# Patient Record
Sex: FEMALE | Race: WHITE | NOT HISPANIC OR LATINO | Employment: UNEMPLOYED | URBAN - METROPOLITAN AREA
[De-identification: names, ages, dates, MRNs, and addresses within clinical notes are randomized per-mention and may not be internally consistent; named-entity substitution may affect disease eponyms.]

---

## 2023-03-23 ENCOUNTER — TELEPHONE (OUTPATIENT)
Dept: PRIMARY CARE | Facility: CLINIC | Age: 88
End: 2023-03-23
Payer: MEDICARE

## 2023-03-23 NOTE — TELEPHONE ENCOUNTER
Pts daughter Nicky is calling in regards to fareed losing 20lbs in the past 2 years and is just wondering maybe if she should start drinking ensure or maybe a supplement, Nicky states mom is thin but does not look malnourished - JMP

## 2023-04-17 ENCOUNTER — TELEPHONE (OUTPATIENT)
Dept: PRIMARY CARE | Facility: CLINIC | Age: 88
End: 2023-04-17
Payer: MEDICARE

## 2023-04-17 DIAGNOSIS — E55.9 VITAMIN D DEFICIENCY: ICD-10-CM

## 2023-04-17 DIAGNOSIS — R53.83 FATIGUE, UNSPECIFIED TYPE: ICD-10-CM

## 2023-04-17 DIAGNOSIS — I10 PRIMARY HYPERTENSION: Primary | ICD-10-CM

## 2023-04-17 NOTE — TELEPHONE ENCOUNTER
FYI : She has an appt for a Medicare Wellness Friday can you put the orders in the computer for her bw. She cx her appt with Dr. Tolu Saunders said it would be repetitive but can you check and just make sure he did not order anything specific for her. Pjd/nn

## 2023-04-19 PROBLEM — R53.83 FATIGUE: Status: ACTIVE | Noted: 2023-04-19

## 2023-04-19 PROBLEM — R26.81 GAIT INSTABILITY: Status: ACTIVE | Noted: 2023-04-19

## 2023-04-19 PROBLEM — J45.909 ASTHMA (HHS-HCC): Status: ACTIVE | Noted: 2023-04-19

## 2023-04-19 PROBLEM — E78.5 HYPERLIPIDEMIA: Status: ACTIVE | Noted: 2023-04-19

## 2023-04-19 PROBLEM — F41.9 ANXIETY DISORDER: Status: ACTIVE | Noted: 2023-04-19

## 2023-04-19 PROBLEM — M19.90 OSTEOARTHROSIS: Status: ACTIVE | Noted: 2023-04-19

## 2023-04-19 PROBLEM — I48.91 ATRIAL FIBRILLATION (MULTI): Status: ACTIVE | Noted: 2023-04-19

## 2023-04-19 PROBLEM — M16.11 ARTHRITIS OF RIGHT HIP: Status: ACTIVE | Noted: 2023-04-19

## 2023-04-19 PROBLEM — J45.41: Status: ACTIVE | Noted: 2023-04-19

## 2023-04-19 PROBLEM — L50.8 CHRONIC URTICARIA: Status: ACTIVE | Noted: 2023-04-19

## 2023-04-19 PROBLEM — R41.89 COGNITIVE IMPAIRMENT: Status: ACTIVE | Noted: 2023-04-19

## 2023-04-19 PROBLEM — D51.0 PERNICIOUS ANEMIA: Status: ACTIVE | Noted: 2023-04-19

## 2023-04-19 PROBLEM — N18.30 CKD (CHRONIC KIDNEY DISEASE), STAGE III (MULTI): Status: ACTIVE | Noted: 2023-04-19

## 2023-04-19 NOTE — PROGRESS NOTES
Chief Complaint: Medicare Wellness Exam/Comprehensive Problem Focused Follow Up and Physical Exam    HPI: Patient presents today for Medicare wellness exam  Past history significant for  She really has been extraordinarily healthy  History of atrial fibrillation follows routinely with cardiology is on chronic anticoagulation hypercholesteremia  Pernicious anemia  Asthma  Dementia  Gastroesophageal reflux  2014 had pneumonia and influenza hospitalized intubated multiorgan failure but did come back from this and currently resides in an independent living facility  She is active in the facility walking      Active Problem List      Comprehensive Medical/Surgical/Social/Family History  Past Medical History:   Diagnosis Date    Essential (primary) hypertension     Benign essential hypertension    Noninfective gastroenteritis and colitis, unspecified     Chronic colitis    Other conditions influencing health status     Bone Density Studies Dual-Energy X-ray Absorptiometry    Other conditions influencing health status     Substance Use Disorders    Other conditions influencing health status     Adenocarcinoma Of The Breast    Other conditions influencing health status 07/13/2017    History of cough    Shortness of breath 07/18/2022    Shortness of breath     Past Surgical History:   Procedure Laterality Date    COLONOSCOPY  04/23/2013    Complete Colonoscopy    LUMBAR LAMINECTOMY  05/06/2013    Laminectomy Lumbar    MASTECTOMY  05/06/2013    Breast Surgery Mastectomy    TOTAL ABDOMINAL HYSTERECTOMY W/ BILATERAL SALPINGOOPHORECTOMY  05/06/2013    Total Abdominal Hysterectomy With Removal Of Both Ovaries    TOTAL HIP ARTHROPLASTY  05/06/2013    Total Hip Replacement     Social History     Social History Narrative    Not on file         Allergies and Medications  Patient has no allergy information on record.  No current outpatient medications on file prior to visit.     No current facility-administered medications on file  "prior to visit.       Medications and Supplements  prescribed by me and other practitioners or clinical pharmacist (such as prescriptions, OTC's, herbal therapies and supplements) were reviewed and documented in the medical record.    Tobacco/Alcohol/Opioid use, as well as Illicit Drug Use was screened for/reviewed and documented in Social History section and medication list as appropriate  Activities of Daily Living  In your present state of health, do you have any difficulty performing the following activities?:   Preparing food and eating?: Yes  Bathing yourself: Yes  Getting dressed: Yes  Using the toilet:Yes  Moving around from place to place: Yes  In the past year have you fallen or had a near fall?:Yes    Depression Screen  (Note: if answer to either of the following is \"Yes\", then a more complete depression screening is indicated)   Q1: Over the past two weeks, have you felt down, depressed or hopeless? No  Q2: Over the past two weeks, have you felt little interest or pleasure in doing things? No    Current exercise habits: Exercise is limited by cardiac condition(s): Atrial fibrillation.   Dietary issues discussed: Yes  Hearing difficulties: Yes  Safe in current home environment: yes  Visual Acuity assessed: no  Cognitive Impairment assessed: yes       Advance directives  Advanced Care Planning (including a Living Will, Healthcare POA, as well as specific end of life choices and/or directives), was discussed for approximately 16 minutes with the patient and/or surrogate, voluntarily, and documented in the medical record.     Cardiac Risk Assessment  Cardiovascular risk was discussed and, if needed, lifestyle modifications recommended, including nutritional choices, exercise, and elimination of habits contributing to risk. We agreed on a plan to reduce the current cardiovascular risk based on above discussion as needed.  Aspirin use/disuse was discussed after reviewing the updated guidelines " below:    Consider low dose Aspirin ( mg) use if the benefit for cardiovascular disease prevention outweighs risk for bleeding complications.   In general, low dose ASA should be considered:  In patients WITHOUT prior MI/stroke/PAD (primary prevention):   a. Age <60: Use if 10-year cardiovascular disease risk >20%, with discussion of risks and benefits with patient  b. Age 60-<70: Use if 10-year cardiovascular disease risk >20% and low bleeding (e.g., gastrointenstinal) risk, with discussion of risks and benefits with patient  c. Age >=70: Do not use    In patients WITH prior MI/stroke/PAD (secondary prevention):   Generally use unless extremely high bleeding (e.g., gastrointenstinal) risk, with discussion of risks and benefits with patient    ROS otherwise negative aside from what was mentioned above in HPI.    Vitals  There were no vitals taken for this visit.  There is no height or weight on file to calculate BMI.  Physical Exam  Gen: Alert, NAD  HEENT:  PERRLA, EOMI, conjunctiva and sclera normal in appearance. External auditory canals/TMs normal; Oral cavity and posterior pharynx without lesions/exudate  Neck:  Supple with FROM; No masses/nodes palpable; Thyroid nontender and without nodules; No JAYNA  Respiratory:  Lungs CTAB  Cardiovascular:  Heart  irregular no M/R/G. Peripheral pulses equal bilaterally  Trace edema is present left greater than right which is baseline for her  Abdomen:  Soft, nontender, BS present throughout; No R/G/R; No HSM or masses palpated  Extremities:  FROM all extremities; Muscle strength grossly normal with good tone  Neuro:  CN II-XII intact; Reflexes 2+/2+; Gross motor and sensory intact  Skin:  No suspicious lesions present  Breast: No masses, skin lesions or nipple discharges, no axillary lymphadenopathy    Gait is unstable difficulty getting up more than anything else    Assessment and Plan:  1 Atrial fibrillation stable we did obtain an EKG which shows atrial fibrillation  rate is well controlled around 70 continue current regimen she is on chronic anticoagulation  2.  Hypercholesteremia we checked blood work today  3.  History of congestive heart failure clinically is euvolemic  4.  Asthma stable lungs are clear she uses nebulizer a couple times a day  5.  DJD this is really one of her big limiting factors sometimes forgets and has had a few falls  6.  Cognitive impairment seems to be doing well she is in assisted living some progression but overall seems to be doing okay I feel she is in an appropriate place seems safe in her environment  7.  Health maintenance  Increase oral intake she has lost some weight we discussed supplements like Ensure  Blood work is pending  She remains pretty active physically mentally at her assisted living facility  Up-to-date with immunizations  Increase water intake  She does have a living will power of  for healthcare as well  #8 anxiety stable on low-dose Zoloft continue current regimen  9.  Chronic kidney disease stable          During the course of the visit the patient was educated and counseled about age appropriate screening and preventive services. Completed preventive screenings were documented in the chart and orders were placed for outstanding screenings/procedures as documented in the Assessment and Plan.      Patient Instructions (the written plan) was given to the patient at check out.      Haleigh Ponce MD

## 2023-04-20 ENCOUNTER — LAB (OUTPATIENT)
Dept: LAB | Facility: LAB | Age: 88
End: 2023-04-20
Payer: MEDICARE

## 2023-04-20 ENCOUNTER — OFFICE VISIT (OUTPATIENT)
Dept: PRIMARY CARE | Facility: CLINIC | Age: 88
End: 2023-04-20
Payer: MEDICARE

## 2023-04-20 VITALS
SYSTOLIC BLOOD PRESSURE: 118 MMHG | HEIGHT: 63 IN | BODY MASS INDEX: 22.68 KG/M2 | DIASTOLIC BLOOD PRESSURE: 68 MMHG | WEIGHT: 128 LBS

## 2023-04-20 DIAGNOSIS — I48.11 LONGSTANDING PERSISTENT ATRIAL FIBRILLATION (MULTI): ICD-10-CM

## 2023-04-20 DIAGNOSIS — N18.31 STAGE 3A CHRONIC KIDNEY DISEASE (MULTI): ICD-10-CM

## 2023-04-20 DIAGNOSIS — R53.83 FATIGUE, UNSPECIFIED TYPE: ICD-10-CM

## 2023-04-20 DIAGNOSIS — F41.9 ANXIETY DISORDER, UNSPECIFIED TYPE: ICD-10-CM

## 2023-04-20 DIAGNOSIS — I50.9 CONGESTIVE HEART FAILURE, UNSPECIFIED HF CHRONICITY, UNSPECIFIED HEART FAILURE TYPE (MULTI): ICD-10-CM

## 2023-04-20 DIAGNOSIS — E55.9 VITAMIN D DEFICIENCY: ICD-10-CM

## 2023-04-20 DIAGNOSIS — I10 PRIMARY HYPERTENSION: ICD-10-CM

## 2023-04-20 DIAGNOSIS — J45.40 MODERATE PERSISTENT ASTHMA WITHOUT COMPLICATION (HHS-HCC): Primary | ICD-10-CM

## 2023-04-20 DIAGNOSIS — M19.90 OSTEOARTHRITIS, UNSPECIFIED OSTEOARTHRITIS TYPE, UNSPECIFIED SITE: ICD-10-CM

## 2023-04-20 PROBLEM — E78.01 FAMILIAL HYPERCHOLESTEREMIA: Status: ACTIVE | Noted: 2023-04-20

## 2023-04-20 LAB
ALANINE AMINOTRANSFERASE (SGPT) (U/L) IN SER/PLAS: 11 U/L (ref 7–45)
ALBUMIN (G/DL) IN SER/PLAS: 3.4 G/DL (ref 3.4–5)
ALKALINE PHOSPHATASE (U/L) IN SER/PLAS: 56 U/L (ref 33–136)
ANION GAP IN SER/PLAS: 11 MMOL/L (ref 10–20)
ASPARTATE AMINOTRANSFERASE (SGOT) (U/L) IN SER/PLAS: 16 U/L (ref 9–39)
BILIRUBIN TOTAL (MG/DL) IN SER/PLAS: 0.4 MG/DL (ref 0–1.2)
CALCIDIOL (25 OH VITAMIN D3) (NG/ML) IN SER/PLAS: 44 NG/ML
CALCIUM (MG/DL) IN SER/PLAS: 8.5 MG/DL (ref 8.6–10.3)
CARBON DIOXIDE, TOTAL (MMOL/L) IN SER/PLAS: 27 MMOL/L (ref 21–32)
CHLORIDE (MMOL/L) IN SER/PLAS: 105 MMOL/L (ref 98–107)
CHOLESTEROL (MG/DL) IN SER/PLAS: 121 MG/DL (ref 0–199)
CHOLESTEROL IN HDL (MG/DL) IN SER/PLAS: 34 MG/DL
CHOLESTEROL/HDL RATIO: 3.6
CREATININE (MG/DL) IN SER/PLAS: 0.72 MG/DL (ref 0.5–1.05)
ERYTHROCYTE DISTRIBUTION WIDTH (RATIO) BY AUTOMATED COUNT: 15.1 % (ref 11.5–14.5)
ERYTHROCYTE MEAN CORPUSCULAR HEMOGLOBIN CONCENTRATION (G/DL) BY AUTOMATED: 31.2 G/DL (ref 32–36)
ERYTHROCYTE MEAN CORPUSCULAR VOLUME (FL) BY AUTOMATED COUNT: 87 FL (ref 80–100)
ERYTHROCYTES (10*6/UL) IN BLOOD BY AUTOMATED COUNT: 4.37 X10E12/L (ref 4–5.2)
GFR FEMALE: 78 ML/MIN/1.73M2
GLUCOSE (MG/DL) IN SER/PLAS: 113 MG/DL (ref 74–99)
HEMATOCRIT (%) IN BLOOD BY AUTOMATED COUNT: 38.2 % (ref 36–46)
HEMOGLOBIN (G/DL) IN BLOOD: 11.9 G/DL (ref 12–16)
LDL: 70 MG/DL (ref 0–99)
LEUKOCYTES (10*3/UL) IN BLOOD BY AUTOMATED COUNT: 6.3 X10E9/L (ref 4.4–11.3)
PLATELETS (10*3/UL) IN BLOOD AUTOMATED COUNT: 184 X10E9/L (ref 150–450)
POTASSIUM (MMOL/L) IN SER/PLAS: 3.8 MMOL/L (ref 3.5–5.3)
PROTEIN TOTAL: 6.1 G/DL (ref 6.4–8.2)
SODIUM (MMOL/L) IN SER/PLAS: 139 MMOL/L (ref 136–145)
THYROTROPIN (MIU/L) IN SER/PLAS BY DETECTION LIMIT <= 0.05 MIU/L: 1.97 MIU/L (ref 0.44–3.98)
TRIGLYCERIDE (MG/DL) IN SER/PLAS: 85 MG/DL (ref 0–149)
UREA NITROGEN (MG/DL) IN SER/PLAS: 20 MG/DL (ref 6–23)
VLDL: 17 MG/DL (ref 0–40)

## 2023-04-20 PROCEDURE — 36415 COLL VENOUS BLD VENIPUNCTURE: CPT

## 2023-04-20 PROCEDURE — 80053 COMPREHEN METABOLIC PANEL: CPT

## 2023-04-20 PROCEDURE — 82306 VITAMIN D 25 HYDROXY: CPT

## 2023-04-20 PROCEDURE — 1170F FXNL STATUS ASSESSED: CPT | Performed by: INTERNAL MEDICINE

## 2023-04-20 PROCEDURE — 1157F ADVNC CARE PLAN IN RCRD: CPT | Performed by: INTERNAL MEDICINE

## 2023-04-20 PROCEDURE — 1159F MED LIST DOCD IN RCRD: CPT | Performed by: INTERNAL MEDICINE

## 2023-04-20 PROCEDURE — 93000 ELECTROCARDIOGRAM COMPLETE: CPT | Performed by: INTERNAL MEDICINE

## 2023-04-20 PROCEDURE — 84443 ASSAY THYROID STIM HORMONE: CPT

## 2023-04-20 PROCEDURE — 1160F RVW MEDS BY RX/DR IN RCRD: CPT | Performed by: INTERNAL MEDICINE

## 2023-04-20 PROCEDURE — 1036F TOBACCO NON-USER: CPT | Performed by: INTERNAL MEDICINE

## 2023-04-20 PROCEDURE — 80061 LIPID PANEL: CPT

## 2023-04-20 PROCEDURE — G0439 PPPS, SUBSEQ VISIT: HCPCS | Performed by: INTERNAL MEDICINE

## 2023-04-20 PROCEDURE — 85027 COMPLETE CBC AUTOMATED: CPT

## 2023-04-20 PROCEDURE — 99214 OFFICE O/P EST MOD 30 MIN: CPT | Performed by: INTERNAL MEDICINE

## 2023-04-20 RX ORDER — ALBUTEROL SULFATE 90 UG/1
2 POWDER, METERED RESPIRATORY (INHALATION) EVERY 4 HOURS PRN
COMMUNITY

## 2023-04-20 RX ORDER — TRIAMCINOLONE ACETONIDE 1 MG/G
1 CREAM TOPICAL 3 TIMES DAILY
COMMUNITY
Start: 2015-09-29

## 2023-04-20 RX ORDER — LATANOPROST 50 UG/ML
1 SOLUTION/ DROPS OPHTHALMIC DAILY
COMMUNITY
Start: 2020-07-10

## 2023-04-20 RX ORDER — FLUTICASONE PROPIONATE AND SALMETEROL 100; 50 UG/1; UG/1
POWDER RESPIRATORY (INHALATION)
COMMUNITY
Start: 2021-01-12 | End: 2023-12-09 | Stop reason: ENTERED-IN-ERROR

## 2023-04-20 RX ORDER — ALBUTEROL SULFATE 0.83 MG/ML
3 SOLUTION RESPIRATORY (INHALATION) 3 TIMES DAILY PRN
COMMUNITY

## 2023-04-20 RX ORDER — CYANOCOBALAMIN 1000 UG/ML
1 INJECTION, SOLUTION INTRAMUSCULAR; SUBCUTANEOUS
COMMUNITY
Start: 2016-07-20

## 2023-04-20 RX ORDER — CYCLOSPORINE 0.5 MG/ML
1 EMULSION OPHTHALMIC EVERY 12 HOURS
COMMUNITY
Start: 2017-04-10

## 2023-04-20 RX ORDER — OMEPRAZOLE 20 MG/1
1 CAPSULE, DELAYED RELEASE ORAL DAILY
COMMUNITY
Start: 2018-03-26

## 2023-04-20 RX ORDER — DORZOLAMIDE HYDROCHLORIDE AND TIMOLOL MALEATE 20; 5 MG/ML; MG/ML
1 SOLUTION/ DROPS OPHTHALMIC 2 TIMES DAILY
COMMUNITY
Start: 2023-03-27

## 2023-04-20 RX ORDER — GUAIFENESIN 600 MG/1
TABLET, EXTENDED RELEASE ORAL
COMMUNITY
Start: 2015-07-08 | End: 2023-11-06

## 2023-04-20 RX ORDER — CETIRIZINE HYDROCHLORIDE 10 MG/1
1 TABLET ORAL
COMMUNITY
Start: 2015-12-29

## 2023-04-20 RX ORDER — CHOLECALCIFEROL (VITAMIN D3) 25 MCG
3 TABLET ORAL DAILY
COMMUNITY
Start: 2018-03-30

## 2023-04-20 RX ORDER — SERTRALINE HYDROCHLORIDE 100 MG/1
1 TABLET, FILM COATED ORAL DAILY
COMMUNITY
Start: 2011-09-27

## 2023-04-20 RX ORDER — MONTELUKAST SODIUM 10 MG/1
TABLET ORAL
COMMUNITY
Start: 2017-07-13 | End: 2023-10-23

## 2023-04-20 RX ORDER — ATORVASTATIN CALCIUM 80 MG/1
TABLET, FILM COATED ORAL
COMMUNITY
Start: 2015-03-23 | End: 2023-11-27

## 2023-04-20 RX ORDER — APIXABAN 2.5 MG/1
1 TABLET, FILM COATED ORAL 2 TIMES DAILY
COMMUNITY
Start: 2015-03-19

## 2023-04-20 RX ORDER — METOPROLOL SUCCINATE 50 MG/1
1 TABLET, EXTENDED RELEASE ORAL DAILY
COMMUNITY
Start: 2016-03-21

## 2023-04-20 RX ORDER — BUDESONIDE 0.5 MG/2ML
0.5 INHALANT ORAL EVERY 12 HOURS
COMMUNITY
Start: 2016-04-13

## 2023-04-20 RX ORDER — ALBUTEROL SULFATE 90 UG/1
2 AEROSOL, METERED RESPIRATORY (INHALATION) EVERY 4 HOURS PRN
COMMUNITY
Start: 2015-03-12

## 2023-04-20 RX ORDER — FUROSEMIDE 20 MG/1
1 TABLET ORAL DAILY
COMMUNITY
Start: 2021-12-07 | End: 2023-10-30

## 2023-04-20 RX ORDER — ACETAMINOPHEN 325 MG/1
2 TABLET ORAL EVERY 6 HOURS PRN
COMMUNITY
Start: 2022-05-23

## 2023-04-20 RX ORDER — POLYVINYL ALCOHOL 14 MG/ML
1 SOLUTION/ DROPS OPHTHALMIC 3 TIMES DAILY
COMMUNITY
Start: 2019-03-04

## 2023-04-20 RX ORDER — HYDROXYZINE HYDROCHLORIDE 10 MG/1
1 TABLET, FILM COATED ORAL NIGHTLY
COMMUNITY
Start: 2016-12-29

## 2023-04-20 ASSESSMENT — ACTIVITIES OF DAILY LIVING (ADL)
DOING_HOUSEWORK: TOTAL CARE
TAKING_MEDICATION: TOTAL CARE
DRESSING: INDEPENDENT
MANAGING_FINANCES: TOTAL CARE
BATHING: INDEPENDENT
GROCERY_SHOPPING: TOTAL CARE

## 2023-04-20 ASSESSMENT — PAIN SCALES - GENERAL: PAINLEVEL: 0-NO PAIN

## 2023-04-20 ASSESSMENT — ENCOUNTER SYMPTOMS
DEPRESSION: 0
OCCASIONAL FEELINGS OF UNSTEADINESS: 1
LOSS OF SENSATION IN FEET: 0

## 2023-04-20 ASSESSMENT — PATIENT HEALTH QUESTIONNAIRE - PHQ9
SUM OF ALL RESPONSES TO PHQ9 QUESTIONS 1 AND 2: 2
1. LITTLE INTEREST OR PLEASURE IN DOING THINGS: SEVERAL DAYS
2. FEELING DOWN, DEPRESSED OR HOPELESS: SEVERAL DAYS

## 2023-05-12 ENCOUNTER — TELEPHONE (OUTPATIENT)
Dept: PRIMARY CARE | Facility: CLINIC | Age: 88
End: 2023-05-12
Payer: MEDICARE

## 2023-06-30 ENCOUNTER — TELEPHONE (OUTPATIENT)
Dept: PRIMARY CARE | Facility: CLINIC | Age: 88
End: 2023-06-30
Payer: MEDICARE

## 2023-06-30 NOTE — TELEPHONE ENCOUNTER
Roby Rosenberg called and would like to report a fall Carine had this morning getting out of bed. Pt is doing fine no brusing and had some slight pain they did give her tylenol which reduced pain from 6 to a 1 - Jmp

## 2023-10-23 DIAGNOSIS — J45.20 MILD INTERMITTENT ASTHMA WITHOUT COMPLICATION (HHS-HCC): Primary | ICD-10-CM

## 2023-10-23 RX ORDER — MONTELUKAST SODIUM 10 MG/1
TABLET ORAL
Qty: 90 TABLET | Refills: 10 | Status: SHIPPED | OUTPATIENT
Start: 2023-10-23

## 2023-10-30 DIAGNOSIS — I48.0 PAROXYSMAL ATRIAL FIBRILLATION (MULTI): Primary | ICD-10-CM

## 2023-10-30 RX ORDER — FUROSEMIDE 20 MG/1
20 TABLET ORAL DAILY
Qty: 30 TABLET | Refills: 10 | Status: SHIPPED | OUTPATIENT
Start: 2023-10-30

## 2023-11-06 DIAGNOSIS — J45.20 MILD INTERMITTENT ASTHMA WITHOUT COMPLICATION (HHS-HCC): Primary | ICD-10-CM

## 2023-11-06 RX ORDER — SYRINGE-NEEDLE,INSULIN,0.5 ML 28GX1/2"
SYRINGE, EMPTY DISPOSABLE MISCELLANEOUS
Qty: 60 TABLET | Refills: 10 | Status: SHIPPED | OUTPATIENT
Start: 2023-11-06

## 2023-11-27 DIAGNOSIS — E78.00 PURE HYPERCHOLESTEROLEMIA: Primary | ICD-10-CM

## 2023-11-27 RX ORDER — ATORVASTATIN CALCIUM 80 MG/1
80 TABLET, FILM COATED ORAL NIGHTLY
Qty: 90 TABLET | Refills: 10 | Status: SHIPPED | OUTPATIENT
Start: 2023-11-27

## 2023-12-04 DIAGNOSIS — R73.9 HYPERGLYCEMIA: ICD-10-CM

## 2023-12-04 DIAGNOSIS — R53.83 OTHER FATIGUE: Primary | ICD-10-CM

## 2023-12-04 DIAGNOSIS — F41.1 GENERALIZED ANXIETY DISORDER: ICD-10-CM

## 2023-12-04 DIAGNOSIS — I48.0 PAROXYSMAL ATRIAL FIBRILLATION (MULTI): ICD-10-CM

## 2023-12-04 DIAGNOSIS — R41.89 COGNITIVE IMPAIRMENT: ICD-10-CM

## 2023-12-04 DIAGNOSIS — R30.0 DYSURIA: ICD-10-CM

## 2023-12-05 ENCOUNTER — TELEPHONE (OUTPATIENT)
Dept: PRIMARY CARE | Facility: CLINIC | Age: 88
End: 2023-12-05
Payer: MEDICARE

## 2023-12-08 ENCOUNTER — TELEPHONE (OUTPATIENT)
Dept: PRIMARY CARE | Facility: CLINIC | Age: 88
End: 2023-12-08
Payer: MEDICARE

## 2023-12-08 DIAGNOSIS — R30.0 DYSURIA: Primary | ICD-10-CM

## 2023-12-08 RX ORDER — NITROFURANTOIN 25; 75 MG/1; MG/1
100 CAPSULE ORAL 2 TIMES DAILY
Qty: 10 CAPSULE | Refills: 0 | Status: SHIPPED | OUTPATIENT
Start: 2023-12-08 | End: 2023-12-13

## 2023-12-08 NOTE — TELEPHONE ENCOUNTER
Mental status change  has been able obtain urine  will empirically treat with Macrobid and await  culture

## 2023-12-09 ENCOUNTER — HOSPITAL ENCOUNTER (INPATIENT)
Facility: HOSPITAL | Age: 88
LOS: 3 days | Discharge: NURSING, REHAB OR LONG-TERM CARE | DRG: 193 | End: 2023-12-12
Attending: GENERAL PRACTICE | Admitting: INTERNAL MEDICINE
Payer: MEDICARE

## 2023-12-09 ENCOUNTER — APPOINTMENT (OUTPATIENT)
Dept: RADIOLOGY | Facility: HOSPITAL | Age: 88
DRG: 193 | End: 2023-12-09
Payer: MEDICARE

## 2023-12-09 DIAGNOSIS — Z99.81 HYPOXEMIA REQUIRING SUPPLEMENTAL OXYGEN: ICD-10-CM

## 2023-12-09 DIAGNOSIS — R60.0 LOCALIZED EDEMA: ICD-10-CM

## 2023-12-09 DIAGNOSIS — J45.901 ACUTE ASTHMA EXACERBATION (HHS-HCC): Primary | ICD-10-CM

## 2023-12-09 DIAGNOSIS — R06.82 TACHYPNEA: ICD-10-CM

## 2023-12-09 DIAGNOSIS — R09.02 HYPOXEMIA REQUIRING SUPPLEMENTAL OXYGEN: ICD-10-CM

## 2023-12-09 DIAGNOSIS — J18.9 COMMUNITY ACQUIRED PNEUMONIA, UNSPECIFIED LATERALITY: ICD-10-CM

## 2023-12-09 PROBLEM — E87.6 HYPOKALEMIA: Status: ACTIVE | Noted: 2023-12-09

## 2023-12-09 PROBLEM — R53.1 WEAKNESS GENERALIZED: Status: ACTIVE | Noted: 2023-12-09

## 2023-12-09 LAB
ALBUMIN SERPL BCP-MCNC: 3.4 G/DL (ref 3.4–5)
ALP SERPL-CCNC: 71 U/L (ref 33–136)
ALT SERPL W P-5'-P-CCNC: 19 U/L (ref 7–45)
ANION GAP BLDV CALCULATED.4IONS-SCNC: 12 MMOL/L (ref 10–25)
ANION GAP SERPL CALC-SCNC: 15 MMOL/L (ref 10–20)
APPEARANCE UR: CLEAR
AST SERPL W P-5'-P-CCNC: 24 U/L (ref 9–39)
BASE EXCESS BLDV CALC-SCNC: 0.4 MMOL/L (ref -2–3)
BASOPHILS # BLD AUTO: 0.02 X10*3/UL (ref 0–0.1)
BASOPHILS NFR BLD AUTO: 0.2 %
BILIRUB SERPL-MCNC: 0.9 MG/DL (ref 0–1.2)
BILIRUB UR STRIP.AUTO-MCNC: NEGATIVE MG/DL
BNP SERPL-MCNC: 637 PG/ML (ref 0–99)
BODY TEMPERATURE: 37 DEGREES CELSIUS
BUN SERPL-MCNC: 17 MG/DL (ref 6–23)
CA-I BLDV-SCNC: 1.19 MMOL/L (ref 1.1–1.33)
CALCIUM SERPL-MCNC: 9.1 MG/DL (ref 8.6–10.3)
CARDIAC TROPONIN I PNL SERPL HS: 16 NG/L (ref 0–13)
CARDIAC TROPONIN I PNL SERPL HS: 17 NG/L (ref 0–13)
CHLORIDE BLDV-SCNC: 99 MMOL/L (ref 98–107)
CHLORIDE SERPL-SCNC: 96 MMOL/L (ref 98–107)
CO2 SERPL-SCNC: 24 MMOL/L (ref 21–32)
COLOR UR: YELLOW
CREAT SERPL-MCNC: 0.63 MG/DL (ref 0.5–1.05)
EOSINOPHIL # BLD AUTO: 0.1 X10*3/UL (ref 0–0.4)
EOSINOPHIL NFR BLD AUTO: 1 %
ERYTHROCYTE [DISTWIDTH] IN BLOOD BY AUTOMATED COUNT: 16.4 % (ref 11.5–14.5)
FLUAV RNA RESP QL NAA+PROBE: NOT DETECTED
FLUBV RNA RESP QL NAA+PROBE: NOT DETECTED
GFR SERPL CREATININE-BSD FRML MDRD: 83 ML/MIN/1.73M*2
GLUCOSE BLDV-MCNC: 120 MG/DL (ref 74–99)
GLUCOSE SERPL-MCNC: 104 MG/DL (ref 74–99)
GLUCOSE UR STRIP.AUTO-MCNC: NEGATIVE MG/DL
HCO3 BLDV-SCNC: 24.6 MMOL/L (ref 22–26)
HCT VFR BLD AUTO: 33.1 % (ref 36–46)
HCT VFR BLD EST: 31 % (ref 36–46)
HGB BLD-MCNC: 10.5 G/DL (ref 12–16)
HGB BLDV-MCNC: 10.4 G/DL (ref 12–16)
IMM GRANULOCYTES # BLD AUTO: 0.04 X10*3/UL (ref 0–0.5)
IMM GRANULOCYTES NFR BLD AUTO: 0.4 % (ref 0–0.9)
INHALED O2 CONCENTRATION: 21 %
KETONES UR STRIP.AUTO-MCNC: ABNORMAL MG/DL
LACTATE BLDV-SCNC: 1.7 MMOL/L (ref 0.4–2)
LEUKOCYTE ESTERASE UR QL STRIP.AUTO: ABNORMAL
LYMPHOCYTES # BLD AUTO: 1.99 X10*3/UL (ref 0.8–3)
LYMPHOCYTES NFR BLD AUTO: 20.1 %
MCH RBC QN AUTO: 27 PG (ref 26–34)
MCHC RBC AUTO-ENTMCNC: 31.7 G/DL (ref 32–36)
MCV RBC AUTO: 85 FL (ref 80–100)
MONOCYTES # BLD AUTO: 1.28 X10*3/UL (ref 0.05–0.8)
MONOCYTES NFR BLD AUTO: 12.9 %
NEUTROPHILS # BLD AUTO: 6.48 X10*3/UL (ref 1.6–5.5)
NEUTROPHILS NFR BLD AUTO: 65.4 %
NITRITE UR QL STRIP.AUTO: NEGATIVE
NRBC BLD-RTO: 0 /100 WBCS (ref 0–0)
OXYHGB MFR BLDV: 50.5 % (ref 45–75)
PCO2 BLDV: 37 MM HG (ref 41–51)
PH BLDV: 7.43 PH (ref 7.33–7.43)
PH UR STRIP.AUTO: 5 [PH]
PLATELET # BLD AUTO: 167 X10*3/UL (ref 150–450)
PO2 BLDV: 41 MM HG (ref 35–45)
POTASSIUM BLDV-SCNC: 3.4 MMOL/L (ref 3.5–5.3)
POTASSIUM SERPL-SCNC: 3.1 MMOL/L (ref 3.5–5.3)
PROT SERPL-MCNC: 6.8 G/DL (ref 6.4–8.2)
PROT UR STRIP.AUTO-MCNC: ABNORMAL MG/DL
RBC # BLD AUTO: 3.89 X10*6/UL (ref 4–5.2)
RBC # UR STRIP.AUTO: NEGATIVE /UL
RBC #/AREA URNS AUTO: ABNORMAL /HPF
SAO2 % BLDV: 51 % (ref 45–75)
SARS-COV-2 RNA RESP QL NAA+PROBE: NOT DETECTED
SODIUM BLDV-SCNC: 132 MMOL/L (ref 136–145)
SODIUM SERPL-SCNC: 132 MMOL/L (ref 136–145)
SP GR UR STRIP.AUTO: 1.02
UROBILINOGEN UR STRIP.AUTO-MCNC: <2 MG/DL
WBC # BLD AUTO: 9.9 X10*3/UL (ref 4.4–11.3)
WBC #/AREA URNS AUTO: ABNORMAL /HPF

## 2023-12-09 PROCEDURE — 81001 URINALYSIS AUTO W/SCOPE: CPT | Performed by: GENERAL PRACTICE

## 2023-12-09 PROCEDURE — 36415 COLL VENOUS BLD VENIPUNCTURE: CPT | Performed by: EMERGENCY MEDICINE

## 2023-12-09 PROCEDURE — 83605 ASSAY OF LACTIC ACID: CPT | Performed by: GENERAL PRACTICE

## 2023-12-09 PROCEDURE — 71045 X-RAY EXAM CHEST 1 VIEW: CPT

## 2023-12-09 PROCEDURE — 87086 URINE CULTURE/COLONY COUNT: CPT | Mod: AHULAB | Performed by: GENERAL PRACTICE

## 2023-12-09 PROCEDURE — 94640 AIRWAY INHALATION TREATMENT: CPT

## 2023-12-09 PROCEDURE — 99285 EMERGENCY DEPT VISIT HI MDM: CPT | Mod: 25 | Performed by: GENERAL PRACTICE

## 2023-12-09 PROCEDURE — 99223 1ST HOSP IP/OBS HIGH 75: CPT | Performed by: PHYSICIAN ASSISTANT

## 2023-12-09 PROCEDURE — 93970 EXTREMITY STUDY: CPT

## 2023-12-09 PROCEDURE — 2500000004 HC RX 250 GENERAL PHARMACY W/ HCPCS (ALT 636 FOR OP/ED): Performed by: PHYSICIAN ASSISTANT

## 2023-12-09 PROCEDURE — 84484 ASSAY OF TROPONIN QUANT: CPT | Performed by: PHYSICIAN ASSISTANT

## 2023-12-09 PROCEDURE — 2500000001 HC RX 250 WO HCPCS SELF ADMINISTERED DRUGS (ALT 637 FOR MEDICARE OP): Performed by: PHYSICIAN ASSISTANT

## 2023-12-09 PROCEDURE — 83880 ASSAY OF NATRIURETIC PEPTIDE: CPT | Performed by: EMERGENCY MEDICINE

## 2023-12-09 PROCEDURE — 1100000001 HC PRIVATE ROOM DAILY

## 2023-12-09 PROCEDURE — 82805 BLOOD GASES W/O2 SATURATION: CPT | Performed by: GENERAL PRACTICE

## 2023-12-09 PROCEDURE — 87636 SARSCOV2 & INF A&B AMP PRB: CPT | Performed by: GENERAL PRACTICE

## 2023-12-09 PROCEDURE — 96367 TX/PROPH/DG ADDL SEQ IV INF: CPT

## 2023-12-09 PROCEDURE — 84484 ASSAY OF TROPONIN QUANT: CPT | Performed by: EMERGENCY MEDICINE

## 2023-12-09 PROCEDURE — 2500000002 HC RX 250 W HCPCS SELF ADMINISTERED DRUGS (ALT 637 FOR MEDICARE OP, ALT 636 FOR OP/ED): Performed by: PHYSICIAN ASSISTANT

## 2023-12-09 PROCEDURE — 84132 ASSAY OF SERUM POTASSIUM: CPT | Performed by: EMERGENCY MEDICINE

## 2023-12-09 PROCEDURE — 2500000002 HC RX 250 W HCPCS SELF ADMINISTERED DRUGS (ALT 637 FOR MEDICARE OP, ALT 636 FOR OP/ED)

## 2023-12-09 PROCEDURE — 93970 EXTREMITY STUDY: CPT | Performed by: RADIOLOGY

## 2023-12-09 PROCEDURE — 2500000004 HC RX 250 GENERAL PHARMACY W/ HCPCS (ALT 636 FOR OP/ED): Performed by: GENERAL PRACTICE

## 2023-12-09 PROCEDURE — 96365 THER/PROPH/DIAG IV INF INIT: CPT

## 2023-12-09 PROCEDURE — 85025 COMPLETE CBC W/AUTO DIFF WBC: CPT | Performed by: EMERGENCY MEDICINE

## 2023-12-09 PROCEDURE — 2500000002 HC RX 250 W HCPCS SELF ADMINISTERED DRUGS (ALT 637 FOR MEDICARE OP, ALT 636 FOR OP/ED): Performed by: EMERGENCY MEDICINE

## 2023-12-09 PROCEDURE — 36415 COLL VENOUS BLD VENIPUNCTURE: CPT | Performed by: PHYSICIAN ASSISTANT

## 2023-12-09 RX ORDER — FLUTICASONE PROPIONATE AND SALMETEROL 100; 50 UG/1; UG/1
1 POWDER RESPIRATORY (INHALATION)
COMMUNITY
End: 2023-12-09 | Stop reason: ENTERED-IN-ERROR

## 2023-12-09 RX ORDER — IPRATROPIUM BROMIDE AND ALBUTEROL SULFATE 2.5; .5 MG/3ML; MG/3ML
SOLUTION RESPIRATORY (INHALATION)
Status: COMPLETED
Start: 2023-12-09 | End: 2023-12-09

## 2023-12-09 RX ORDER — CEFTRIAXONE 1 G/50ML
1 INJECTION, SOLUTION INTRAVENOUS EVERY 24 HOURS
Status: DISCONTINUED | OUTPATIENT
Start: 2023-12-10 | End: 2023-12-13 | Stop reason: HOSPADM

## 2023-12-09 RX ORDER — LORATADINE 10 MG/1
10 TABLET ORAL DAILY
Status: DISCONTINUED | OUTPATIENT
Start: 2023-12-09 | End: 2023-12-13 | Stop reason: HOSPADM

## 2023-12-09 RX ORDER — SERTRALINE HYDROCHLORIDE 50 MG/1
100 TABLET, FILM COATED ORAL DAILY
Status: DISCONTINUED | OUTPATIENT
Start: 2023-12-09 | End: 2023-12-13 | Stop reason: HOSPADM

## 2023-12-09 RX ORDER — SENNOSIDES 8.6 MG/1
2 TABLET ORAL 2 TIMES DAILY
Status: DISCONTINUED | OUTPATIENT
Start: 2023-12-09 | End: 2023-12-13 | Stop reason: HOSPADM

## 2023-12-09 RX ORDER — FLUTICASONE PROPIONATE AND SALMETEROL 100; 50 UG/1; UG/1
1 POWDER RESPIRATORY (INHALATION)
COMMUNITY

## 2023-12-09 RX ORDER — AZITHROMYCIN 500 MG/1
500 TABLET, FILM COATED ORAL
Status: DISCONTINUED | OUTPATIENT
Start: 2023-12-10 | End: 2023-12-12

## 2023-12-09 RX ORDER — ACETAMINOPHEN 325 MG/1
650 TABLET ORAL EVERY 4 HOURS PRN
Status: DISCONTINUED | OUTPATIENT
Start: 2023-12-09 | End: 2023-12-13 | Stop reason: HOSPADM

## 2023-12-09 RX ORDER — PANTOPRAZOLE SODIUM 40 MG/1
40 TABLET, DELAYED RELEASE ORAL
Status: DISCONTINUED | OUTPATIENT
Start: 2023-12-10 | End: 2023-12-13 | Stop reason: HOSPADM

## 2023-12-09 RX ORDER — MAGNESIUM SULFATE HEPTAHYDRATE 40 MG/ML
2 INJECTION, SOLUTION INTRAVENOUS ONCE
Status: COMPLETED | OUTPATIENT
Start: 2023-12-09 | End: 2023-12-09

## 2023-12-09 RX ORDER — FUROSEMIDE 20 MG/1
20 TABLET ORAL DAILY
Status: DISCONTINUED | OUTPATIENT
Start: 2023-12-10 | End: 2023-12-13 | Stop reason: HOSPADM

## 2023-12-09 RX ORDER — METOPROLOL SUCCINATE 50 MG/1
50 TABLET, EXTENDED RELEASE ORAL DAILY
Status: DISCONTINUED | OUTPATIENT
Start: 2023-12-09 | End: 2023-12-13 | Stop reason: HOSPADM

## 2023-12-09 RX ORDER — IPRATROPIUM BROMIDE AND ALBUTEROL SULFATE 2.5; .5 MG/3ML; MG/3ML
9 SOLUTION RESPIRATORY (INHALATION) ONCE
Status: COMPLETED | OUTPATIENT
Start: 2023-12-09 | End: 2023-12-09

## 2023-12-09 RX ORDER — CEFTRIAXONE 1 G/50ML
1 INJECTION, SOLUTION INTRAVENOUS ONCE
Status: COMPLETED | OUTPATIENT
Start: 2023-12-09 | End: 2023-12-09

## 2023-12-09 RX ORDER — ALBUTEROL SULFATE 0.83 MG/ML
2.5 SOLUTION RESPIRATORY (INHALATION) EVERY 4 HOURS PRN
Status: DISCONTINUED | OUTPATIENT
Start: 2023-12-09 | End: 2023-12-13 | Stop reason: HOSPADM

## 2023-12-09 RX ORDER — MONTELUKAST SODIUM 10 MG/1
10 TABLET ORAL DAILY
Status: DISCONTINUED | OUTPATIENT
Start: 2023-12-09 | End: 2023-12-13 | Stop reason: HOSPADM

## 2023-12-09 RX ORDER — ACETAMINOPHEN 650 MG/1
650 SUPPOSITORY RECTAL EVERY 4 HOURS PRN
Status: DISCONTINUED | OUTPATIENT
Start: 2023-12-09 | End: 2023-12-13 | Stop reason: HOSPADM

## 2023-12-09 RX ORDER — IPRATROPIUM BROMIDE AND ALBUTEROL SULFATE 2.5; .5 MG/3ML; MG/3ML
3 SOLUTION RESPIRATORY (INHALATION)
Status: DISCONTINUED | OUTPATIENT
Start: 2023-12-09 | End: 2023-12-12

## 2023-12-09 RX ORDER — ATORVASTATIN CALCIUM 80 MG/1
80 TABLET, FILM COATED ORAL NIGHTLY
Status: DISCONTINUED | OUTPATIENT
Start: 2023-12-09 | End: 2023-12-13 | Stop reason: HOSPADM

## 2023-12-09 RX ORDER — ONDANSETRON 4 MG/1
4 TABLET, ORALLY DISINTEGRATING ORAL EVERY 8 HOURS PRN
Status: DISCONTINUED | OUTPATIENT
Start: 2023-12-09 | End: 2023-12-13 | Stop reason: HOSPADM

## 2023-12-09 RX ORDER — BUDESONIDE 0.5 MG/2ML
0.5 INHALANT ORAL
Status: DISCONTINUED | OUTPATIENT
Start: 2023-12-09 | End: 2023-12-13 | Stop reason: HOSPADM

## 2023-12-09 RX ORDER — IPRATROPIUM BROMIDE AND ALBUTEROL SULFATE 2.5; .5 MG/3ML; MG/3ML
3 SOLUTION RESPIRATORY (INHALATION) EVERY 2 HOUR PRN
Status: DISCONTINUED | OUTPATIENT
Start: 2023-12-09 | End: 2023-12-09

## 2023-12-09 RX ORDER — ACETAMINOPHEN 160 MG/5ML
650 SOLUTION ORAL EVERY 4 HOURS PRN
Status: DISCONTINUED | OUTPATIENT
Start: 2023-12-09 | End: 2023-12-13 | Stop reason: HOSPADM

## 2023-12-09 RX ORDER — DORZOLAMIDE HYDROCHLORIDE AND TIMOLOL MALEATE 20; 5 MG/ML; MG/ML
1 SOLUTION/ DROPS OPHTHALMIC 2 TIMES DAILY
Status: DISCONTINUED | OUTPATIENT
Start: 2023-12-09 | End: 2023-12-13 | Stop reason: HOSPADM

## 2023-12-09 RX ORDER — IPRATROPIUM BROMIDE AND ALBUTEROL SULFATE 2.5; .5 MG/3ML; MG/3ML
3 SOLUTION RESPIRATORY (INHALATION)
Status: DISCONTINUED | OUTPATIENT
Start: 2023-12-09 | End: 2023-12-09

## 2023-12-09 RX ORDER — POTASSIUM CHLORIDE 14.9 MG/ML
20 INJECTION INTRAVENOUS
Status: DISPENSED | OUTPATIENT
Start: 2023-12-09 | End: 2023-12-09

## 2023-12-09 RX ORDER — GUAIFENESIN 600 MG/1
600 TABLET, EXTENDED RELEASE ORAL 2 TIMES DAILY
Status: DISCONTINUED | OUTPATIENT
Start: 2023-12-09 | End: 2023-12-13 | Stop reason: HOSPADM

## 2023-12-09 RX ORDER — CHOLECALCIFEROL (VITAMIN D3) 25 MCG
TABLET ORAL
Status: CANCELLED | OUTPATIENT
Start: 2023-12-09

## 2023-12-09 RX ORDER — ONDANSETRON HYDROCHLORIDE 2 MG/ML
4 INJECTION, SOLUTION INTRAVENOUS EVERY 8 HOURS PRN
Status: DISCONTINUED | OUTPATIENT
Start: 2023-12-09 | End: 2023-12-13 | Stop reason: HOSPADM

## 2023-12-09 RX ORDER — ALBUTEROL SULFATE 0.83 MG/ML
2.5 SOLUTION RESPIRATORY (INHALATION) EVERY 2 HOUR PRN
Status: DISCONTINUED | OUTPATIENT
Start: 2023-12-09 | End: 2023-12-13 | Stop reason: HOSPADM

## 2023-12-09 RX ADMIN — AZITHROMYCIN MONOHYDRATE 500 MG: 500 INJECTION, POWDER, LYOPHILIZED, FOR SOLUTION INTRAVENOUS at 12:11

## 2023-12-09 RX ADMIN — GUAIFENESIN 600 MG: 600 TABLET ORAL at 21:40

## 2023-12-09 RX ADMIN — IPRATROPIUM BROMIDE AND ALBUTEROL SULFATE 9 ML: 2.5; .5 SOLUTION RESPIRATORY (INHALATION) at 10:14

## 2023-12-09 RX ADMIN — POTASSIUM CHLORIDE 20 MEQ: 14.9 INJECTION, SOLUTION INTRAVENOUS at 17:54

## 2023-12-09 RX ADMIN — BUDESONIDE INHALATION 0.5 MG: 0.5 SUSPENSION RESPIRATORY (INHALATION) at 18:59

## 2023-12-09 RX ADMIN — CEFTRIAXONE SODIUM 1 G: 1 INJECTION, SOLUTION INTRAVENOUS at 11:32

## 2023-12-09 RX ADMIN — SENNOSIDES 17.2 MG: 8.6 TABLET, FILM COATED ORAL at 21:40

## 2023-12-09 RX ADMIN — IPRATROPIUM BROMIDE AND ALBUTEROL SULFATE 3 ML: 2.5; .5 SOLUTION RESPIRATORY (INHALATION) at 18:59

## 2023-12-09 RX ADMIN — MAGNESIUM SULFATE HEPTAHYDRATE 2 G: 40 INJECTION, SOLUTION INTRAVENOUS at 14:09

## 2023-12-09 RX ADMIN — IPRATROPIUM BROMIDE AND ALBUTEROL SULFATE 3 ML: 2.5; .5 SOLUTION RESPIRATORY (INHALATION) at 15:26

## 2023-12-09 RX ADMIN — ATORVASTATIN CALCIUM 80 MG: 80 TABLET, FILM COATED ORAL at 21:40

## 2023-12-09 RX ADMIN — APIXABAN 2.5 MG: 2.5 TABLET, FILM COATED ORAL at 21:40

## 2023-12-09 SDOH — SOCIAL STABILITY: SOCIAL INSECURITY: HAS ANYONE EVER THREATENED TO HURT YOUR FAMILY OR YOUR PETS?: NO

## 2023-12-09 SDOH — SOCIAL STABILITY: SOCIAL INSECURITY: DO YOU FEEL ANYONE HAS EXPLOITED OR TAKEN ADVANTAGE OF YOU FINANCIALLY OR OF YOUR PERSONAL PROPERTY?: NO

## 2023-12-09 SDOH — SOCIAL STABILITY: SOCIAL INSECURITY: WERE YOU ABLE TO COMPLETE ALL THE BEHAVIORAL HEALTH SCREENINGS?: YES

## 2023-12-09 SDOH — SOCIAL STABILITY: SOCIAL INSECURITY: ABUSE: ADULT

## 2023-12-09 SDOH — SOCIAL STABILITY: SOCIAL INSECURITY: ARE THERE ANY APPARENT SIGNS OF INJURIES/BEHAVIORS THAT COULD BE RELATED TO ABUSE/NEGLECT?: NO

## 2023-12-09 SDOH — SOCIAL STABILITY: SOCIAL INSECURITY: DOES ANYONE TRY TO KEEP YOU FROM HAVING/CONTACTING OTHER FRIENDS OR DOING THINGS OUTSIDE YOUR HOME?: NO

## 2023-12-09 SDOH — SOCIAL STABILITY: SOCIAL INSECURITY: ARE YOU OR HAVE YOU BEEN THREATENED OR ABUSED PHYSICALLY, EMOTIONALLY, OR SEXUALLY BY ANYONE?: NO

## 2023-12-09 SDOH — SOCIAL STABILITY: SOCIAL INSECURITY: DO YOU FEEL UNSAFE GOING BACK TO THE PLACE WHERE YOU ARE LIVING?: NO

## 2023-12-09 SDOH — SOCIAL STABILITY: SOCIAL INSECURITY: HAVE YOU HAD THOUGHTS OF HARMING ANYONE ELSE?: NO

## 2023-12-09 ASSESSMENT — COGNITIVE AND FUNCTIONAL STATUS - GENERAL
MOBILITY SCORE: 22
DAILY ACTIVITIY SCORE: 24
WALKING IN HOSPITAL ROOM: A LITTLE
MOBILITY SCORE: 24
CLIMB 3 TO 5 STEPS WITH RAILING: A LITTLE
PATIENT BASELINE BEDBOUND: NO
DAILY ACTIVITIY SCORE: 24

## 2023-12-09 ASSESSMENT — ACTIVITIES OF DAILY LIVING (ADL)
LACK_OF_TRANSPORTATION: NO
JUDGMENT_ADEQUATE_SAFELY_COMPLETE_DAILY_ACTIVITIES: NO
PATIENT'S MEMORY ADEQUATE TO SAFELY COMPLETE DAILY ACTIVITIES?: NO
WALKS IN HOME: NEEDS ASSISTANCE
HEARING - LEFT EAR: DIFFICULTY WITH NOISE
TOILETING: NEEDS ASSISTANCE
DRESSING YOURSELF: INDEPENDENT
GROOMING: INDEPENDENT
BATHING: INDEPENDENT
HEARING - RIGHT EAR: DIFFICULTY WITH NOISE
FEEDING YOURSELF: INDEPENDENT
ADEQUATE_TO_COMPLETE_ADL: YES
ASSISTIVE_DEVICE: WALKER

## 2023-12-09 ASSESSMENT — LIFESTYLE VARIABLES
AUDIT-C TOTAL SCORE: 0
AUDIT-C TOTAL SCORE: 0
HOW MANY STANDARD DRINKS CONTAINING ALCOHOL DO YOU HAVE ON A TYPICAL DAY: PATIENT DOES NOT DRINK
HOW OFTEN DO YOU HAVE 6 OR MORE DRINKS ON ONE OCCASION: NEVER
HOW OFTEN DO YOU HAVE A DRINK CONTAINING ALCOHOL: NEVER
SKIP TO QUESTIONS 9-10: 1

## 2023-12-09 ASSESSMENT — COLUMBIA-SUICIDE SEVERITY RATING SCALE - C-SSRS
1. IN THE PAST MONTH, HAVE YOU WISHED YOU WERE DEAD OR WISHED YOU COULD GO TO SLEEP AND NOT WAKE UP?: NO
6. HAVE YOU EVER DONE ANYTHING, STARTED TO DO ANYTHING, OR PREPARED TO DO ANYTHING TO END YOUR LIFE?: NO
2. HAVE YOU ACTUALLY HAD ANY THOUGHTS OF KILLING YOURSELF?: NO

## 2023-12-09 ASSESSMENT — PAIN - FUNCTIONAL ASSESSMENT
PAIN_FUNCTIONAL_ASSESSMENT: 0-10
PAIN_FUNCTIONAL_ASSESSMENT: 0-10

## 2023-12-09 ASSESSMENT — PAIN SCALES - GENERAL
PAINLEVEL_OUTOF10: 0 - NO PAIN

## 2023-12-09 ASSESSMENT — PATIENT HEALTH QUESTIONNAIRE - PHQ9
SUM OF ALL RESPONSES TO PHQ9 QUESTIONS 1 & 2: 2
1. LITTLE INTEREST OR PLEASURE IN DOING THINGS: SEVERAL DAYS
2. FEELING DOWN, DEPRESSED OR HOPELESS: SEVERAL DAYS

## 2023-12-09 NOTE — ED TRIAGE NOTES
Pt BIBA with the c/o SOB per nursing facility. Per EMS pt oxygen saturation was in the 70's they gave multiple breathing Tx over night and called EMS this morning to have her evaluated for possible pneumonia. Pt denies discomfort, pt denies shortness of breath.

## 2023-12-09 NOTE — H&P
History Of Present Illness  Carine uLz is a 89 y.o. female presenting with acute asthma exacerbation, hypoxemia, tachypnea, early pneumonia, hypokalemia, general weakness and fatigue..  Patient has a history of dementia, CHF, asthma, anxiety, A-fib, GERD.  Patient is accompanied by her daughter today who provides most of the history as the patient does have dementia.  Patient lives at an assisted living facility and apparently became short of breath since yesterday.  According to the notes they gave multiple treatments overnight but the patient's oxygen saturation was in the 70s so the had EMS bring her to the emergency department.  Daughter states that she has noticed her mother has been weak and fatigued about the last 5 days.  They thought she might have a UTI and she was started on Macrobid yesterday.  She has had intermittent shortness of breath with exertion according to the patient but she denies feeling short of breath currently although she is wheezing bilaterally and tachypneic.  Patient denied chest pain or any other symptoms but her daughter states she did not really eat or drink much fluids yesterday and she has been short of breath.  No known nausea or vomiting.  She was febrile here on admission at 37.8.  No known diarrhea complaints of headache, rashes, burning with urination etc.  Patient does have a cough but denies it.  In the emergency department patient was was found to With wheezing.  She was given 9 mL of DuoNeb aerosol at 1 treatment.  Her chest x-ray showed possible pneumonia with scattered right opacities and a small right pleural effusion as well as cardiomegaly.  Patient was treated with Rocephin and azithromycin for possible early pneumonia.  Her EKG according to the ED physician showed atrial fibrillation with a heart rate of 82 but no signs of ischemia.  Patient was placed on 2 L of O2 by nasal cannula.  When they would take her off the O2 she would desat into the high 80s  or low 90s.  She is negative flu and negative COVID.  Patient was also given magnesium 2 g IV.  Her BNP was elevated at 637, troponin was elevated at 16, second troponin is pending.  Sodium was a little low at 132.  CBC showed a normal white blood cell count of 9.9 but she did have a slight left shift.  She has a mild anemia with a hemoglobin of 10.5 and hematocrit of 33.1.  She was 11.9 and 38.2 in April of this year.  Her urine showed 6-10 WBCs and small leuks but negative nitrates.  Urine culture is pending.    Past medical history: Dementia, CHF, asthma, anxiety, A-fib, GERD, hyperlipidemia, osteoarthritis, gastroenteritis and colitis, Smith's esophagus, cataracts, B12 anemia, macular edema, lower extremity edema.      Medications: Patient is on an extensive list of medication, reviewed on the paperwork from [facility] [employer].  It includes albuterol, atorvastatin, budesonide, cetirizine, eyedrops, Eliquis, Lasix, hydroxyzine, metoprolol, montelukast, Mucinex, omeprazole, sertraline, Wixela.    Allergies are to meperidine and codeine    Social history: Denies alcohol or tobacco use.    CODE STATUS: I did speak with the patient's 2 daughters who are her first and second medical power of 's.  They state that patient can be intubated if needed but no CPR or defibrillation should be done.     Past Medical History:   Diagnosis Date    Essential (primary) hypertension     Benign essential hypertension    Noninfective gastroenteritis and colitis, unspecified     Chronic colitis    Other conditions influencing health status     Bone Density Studies Dual-Energy X-ray Absorptiometry    Other conditions influencing health status     Substance Use Disorders    Other conditions influencing health status     Adenocarcinoma Of The Breast    Other conditions influencing health status 07/13/2017    History of cough    Shortness of breath 07/18/2022    Shortness of breath     Past Surgical History:   Procedure Laterality  Date    COLONOSCOPY  04/23/2013    Complete Colonoscopy    LUMBAR LAMINECTOMY  05/06/2013    Laminectomy Lumbar    MASTECTOMY  05/06/2013    Breast Surgery Mastectomy    TOTAL ABDOMINAL HYSTERECTOMY W/ BILATERAL SALPINGOOPHORECTOMY  05/06/2013    Total Abdominal Hysterectomy With Removal Of Both Ovaries    TOTAL HIP ARTHROPLASTY  05/06/2013    Total Hip Replacement     Social History     Tobacco Use    Smoking status: Never    Smokeless tobacco: Never   Vaping Use    Vaping Use: Never used   Substance Use Topics    Alcohol use: Not Currently    Drug use: Never        Family History  No family history on file.     Allergies  Codeine, Meperidine, and Morphine    Review of Systems  Patient denies chest pain, nausea, vomiting, fever, chills, diarrhea, constipation,  vision changes, rashes, dysuria, paresthesias, vertigo, headache, cough or cold symptoms, or any other complaints at this time. A complete review of systems was done, and is as stated in the history of present illness, is otherwise negative or not pertinent to the complaint.  Patient does have dementia though and the daughter states the patient does have shortness of breath, weakness and fatigue, decreased appetite,.  She also states her mother typically has edema in her feet but she has not not been aware of edema in the lower leg on the left which she does present with today.    Physical Exam  Physical exam: Vital signs and nurses notes were reviewed.    General: Audible wheezing but talks in full sentences and denies shortness of breath.  Alert and oriented to person and place but she does not know her age, the year, or her birthday.  She also does not know why she is here.  .     Head: atraumatic and normocephalic    Eyes: Pupils equal round reactive to light, EOMs are intact, conjunctivae is not injected.    Oropharynx: No erythema or exudate noted, no trismus or drooling, tongue is dry    Ears:  normal external exam, no swelling or erythema,     Nasal:  normal external exam,     Neck: Supple, full range of motion, no lymphadenopathy,     Cardiac: Regular rate, slightly irregular rhythm.  No murmurs noted.     Pulmonary: Patient has diffuse expiratory wheezing bilaterally intermittently.  No accessory muscle use no retraction noted.    Abdomen: Soft,  Nontender. No guarding, rigidity, or distention. Normoactive bowel sounds. No pulsatile masses, no bruits.     Extremities:  Full range of motion.  Patient has pitting edema in both feet and pitting edema in the left lower leg with some venous stasis changes    Skin: No rash seen. Skin is warm and dry     Neuro: Patient is alert but is oriented only to person and she knows she is in the hospital.  She knows her daughter.  She does not know her age, her birthdate, the month or year, or why she is here.  Speech is clear. There is no asymmetry with facial grimaces, and no tongue deviation. Patient moves all extremities independently. Sensation is intact.      Last Recorded Vitals  /75   Pulse 82   Temp 37.8 °C (100 °F) (Oral)   Resp (!) 31   Wt 52.2 kg (115 lb)   SpO2 93%     Relevant Results  Scheduled medications  ipratropium-albuteroL, 3 mL, nebulization, q6h while awake      Continuous medications     PRN medications  PRN medications: albuterol, oxygen    Results for orders placed or performed during the hospital encounter of 12/09/23 (from the past 24 hour(s))   CBC and Auto Differential   Result Value Ref Range    WBC 9.9 4.4 - 11.3 x10*3/uL    nRBC 0.0 0.0 - 0.0 /100 WBCs    RBC 3.89 (L) 4.00 - 5.20 x10*6/uL    Hemoglobin 10.5 (L) 12.0 - 16.0 g/dL    Hematocrit 33.1 (L) 36.0 - 46.0 %    MCV 85 80 - 100 fL    MCH 27.0 26.0 - 34.0 pg    MCHC 31.7 (L) 32.0 - 36.0 g/dL    RDW 16.4 (H) 11.5 - 14.5 %    Platelets 167 150 - 450 x10*3/uL    Neutrophils % 65.4 40.0 - 80.0 %    Immature Granulocytes %, Automated 0.4 0.0 - 0.9 %    Lymphocytes % 20.1 13.0 - 44.0 %    Monocytes % 12.9 2.0 - 10.0 %    Eosinophils %  1.0 0.0 - 6.0 %    Basophils % 0.2 0.0 - 2.0 %    Neutrophils Absolute 6.48 (H) 1.60 - 5.50 x10*3/uL    Immature Granulocytes Absolute, Automated 0.04 0.00 - 0.50 x10*3/uL    Lymphocytes Absolute 1.99 0.80 - 3.00 x10*3/uL    Monocytes Absolute 1.28 (H) 0.05 - 0.80 x10*3/uL    Eosinophils Absolute 0.10 0.00 - 0.40 x10*3/uL    Basophils Absolute 0.02 0.00 - 0.10 x10*3/uL   Comprehensive metabolic panel   Result Value Ref Range    Glucose 104 (H) 74 - 99 mg/dL    Sodium 132 (L) 136 - 145 mmol/L    Potassium 3.1 (L) 3.5 - 5.3 mmol/L    Chloride 96 (L) 98 - 107 mmol/L    Bicarbonate 24 21 - 32 mmol/L    Anion Gap 15 10 - 20 mmol/L    Urea Nitrogen 17 6 - 23 mg/dL    Creatinine 0.63 0.50 - 1.05 mg/dL    eGFR 83 >60 mL/min/1.73m*2    Calcium 9.1 8.6 - 10.3 mg/dL    Albumin 3.4 3.4 - 5.0 g/dL    Alkaline Phosphatase 71 33 - 136 U/L    Total Protein 6.8 6.4 - 8.2 g/dL    AST 24 9 - 39 U/L    Bilirubin, Total 0.9 0.0 - 1.2 mg/dL    ALT 19 7 - 45 U/L   Troponin I, High Sensitivity   Result Value Ref Range    Troponin I, High Sensitivity 16 (H) 0 - 13 ng/L   B-Type Natriuretic Peptide   Result Value Ref Range     (H) 0 - 99 pg/mL   Sars-CoV-2 and Influenza A/B PCR   Result Value Ref Range    Flu A Result Not Detected Not Detected    Flu B Result Not Detected Not Detected    Coronavirus 2019, PCR Not Detected Not Detected   BLOOD GAS VENOUS FULL PANEL   Result Value Ref Range    POCT pH, Venous 7.43 7.33 - 7.43 pH    POCT pCO2, Venous 37 (L) 41 - 51 mm Hg    POCT pO2, Venous 41 35 - 45 mm Hg    POCT SO2, Venous 51 45 - 75 %    POCT Oxy Hemoglobin, Venous 50.5 45.0 - 75.0 %    POCT Hematocrit Calculated, Venous 31.0 (L) 36.0 - 46.0 %    POCT Sodium, Venous 132 (L) 136 - 145 mmol/L    POCT Potassium, Venous 3.4 (L) 3.5 - 5.3 mmol/L    POCT Chloride, Venous 99 98 - 107 mmol/L    POCT Ionized Calicum, Venous 1.19 1.10 - 1.33 mmol/L    POCT Glucose, Venous 120 (H) 74 - 99 mg/dL    POCT Lactate, Venous 1.7 0.4 - 2.0 mmol/L     POCT Base Excess, Venous 0.4 -2.0 - 3.0 mmol/L    POCT HCO3 Calculated, Venous 24.6 22.0 - 26.0 mmol/L    POCT Hemoglobin, Venous 10.4 (L) 12.0 - 16.0 g/dL    POCT Anion Gap, Venous 12.0 10.0 - 25.0 mmol/L    Patient Temperature 37.0 degrees Celsius    FiO2 21 %   Urinalysis with Reflex Microscopic   Result Value Ref Range    Color, Urine Yellow Straw, Yellow    Appearance, Urine Clear Clear    Specific Gravity, Urine 1.016 1.005 - 1.035    pH, Urine 5.0 5.0, 5.5, 6.0, 6.5, 7.0, 7.5, 8.0    Protein, Urine 30 (1+) (N) NEGATIVE mg/dL    Glucose, Urine NEGATIVE NEGATIVE mg/dL    Blood, Urine NEGATIVE NEGATIVE    Ketones, Urine 5 (TRACE) (A) NEGATIVE mg/dL    Bilirubin, Urine NEGATIVE NEGATIVE    Urobilinogen, Urine <2.0 <2.0 mg/dL    Nitrite, Urine NEGATIVE NEGATIVE    Leukocyte Esterase, Urine SMALL (1+) (A) NEGATIVE   Microscopic Only, Urine   Result Value Ref Range    WBC, Urine 6-10 (A) 1-5, NONE /HPF    RBC, Urine 3-5 NONE, 1-2, 3-5 /HPF     XR chest 1 view   Final Result   1. Predominantly right-sided scattered pulmonary opacities, small   right-sided pleural effusion along with cardiomegaly. Finding could   be related to pulmonary edema, however other differential diagnosis   include infectious process or related to chronic lung disease.        Critical Finding:  See findings. Notification was initiated on   12/9/2023 at 10:40 am by  Darío Crowder.  (**-[facility]-**) Instructions:             Signed by: Darío Crowder 12/9/2023 10:41 AM   Dictation workstation:   [id number]      Lower extremity venous duplex bilateral    (Results Pending)          Assessment/Plan   Principal Problem:    Acute asthma exacerbation  Active Problems:    Asthma exacerbation    Pneumonia    Hypokalemia    Tachypnea    Hypoxemia requiring supplemental oxygen    Weakness generalized      Plan: DuoNeb aerosols every 6 hours , albuterol aerosols every 2 to 4 hours as needed  Oxygen at 2 L per nasal cannula for new oxygen demand  Telemetry and  pulse ox in place  Ceftriaxone and azithromycin for possible early pneumonia  Urine culture pending for possible UTI but only 6-10 WBCs in the urine.  Potassium replacement for hypokalemia  PT, OT, and social work consult requested as the patient may need to have a change in her living status from assisted living to skilled nursing secondary to her dementia and possibly not doing her aerosols at home as directed per her daughter.  Also she needs evaluation for weakness and fatigue and ability to care for self etc.  Repeat labs in the morning.  I ordered a second troponin as her first 1 was 16.  That troponin is pending  Bilateral ultrasounds of the lower extremities due to pitting edema in the left lower leg         Florencia Piper PA-C

## 2023-12-09 NOTE — ED PROVIDER NOTES
HPI   Chief Complaint   Patient presents with    Shortness of Breath       HPI: 89-year-old female with a history of dementia and congestive heart failure presents for shortness of breath.  She does also have history of asthma.  For the past week she has felt generally weak and has developed shortness of breath and fever over the past 1 to 2 days.  Staff at her nursing facility called for her.  The patient's daughter and son-in-law are present at bedside providing history      Limitations to history: None  Independent Historians: Patient's daughter and son-in-law  External Records Reviewed: HIE, outpatient notes, inpatient notes  ------------------------------------------------------------------------------------------------------------------------------------------  ROS: a ten point review of systems was performed and was negative except as per HPI.  ------------------------------------------------------------------------------------------------------------------------------------------  PMH / PSH: as per HPI, otherwise reviewed in EMR  MEDS: as per HPI, otherwise reviewed in EMR  ALLERGIES: as per HPI, otherwise reviewed in EMR  SocH:  as per HPI, otherwise reviewed in EMR  FH:  as per HPI, otherwise reviewed in EMR  ------------------------------------------------------------------------------------------------------------------------------------------  Physical Exam:  VS: As documented in the triage note and EMR flowsheet from this visit was reviewed  General: Fatigued appearing. No acute distress.   Eyes:  Extraocular movements grossly intact. No scleral icterus. No discharge  HEENT:  Normocephalic.  Atraumatic  Neck: Moves neck freely. No gross masses  CV: Regular rhythm. No murmurs, rubs or gallops   Resp: Expiratory wheezing bilaterally.  Mild accessory muscle use  GI: Soft, no masses, nontender. No rebound tenderness or guarding  MSK: Symmetric muscle bulk. No deformities. No lower extremity edema.    Skin:  Warm, dry, intact.   Neuro: No focal deficits.  A&O to person and place but not time  Psych: Appropriate for situation  ------------------------------------------------------------------------------------------------------------------------------------------  Hospital Course / Medical Decision Making:  Independent Interpretations: Chest x-ray  EKG as interpreted by me: Atrial fibrillation with an approximate ventricular rate of 82 bpm with a normal axis, no bundle branch block and no signs of acute ischemia    MDM: This is a 89-year-old female with a history of dementia, atrial fibrillation and congestive heart failure presenting with shortness of breath.  She also does have a history of asthma.  She was given DuoNebs and magnesium in the ED which significantly improved her breathing.  She was not given steroids due to the concern that she is experiencing a bacterial infection.  Chest x-ray does show findings consistent with community-acquired pneumonia.  She was given Rocephin and azithromycin in the ED.  BNP elevated in the 600s.  EKG is nonischemic.  She was admitted to the medicine service for further evaluation.    Discussion of Management with Other Providers:   I discussed the patient/results with: Emergency medicine team    Final diagnosis and disposition as below.    Results for orders placed or performed during the hospital encounter of 12/09/23  -CBC and Auto Differential:        Result                      Value             Ref Range           WBC                         9.9               4.4 - 11.3 x*       nRBC                        0.0               0.0 - 0.0 /1*       RBC                         3.89 (L)          4.00 - 5.20 *       Hemoglobin                  10.5 (L)          12.0 - 16.0 *       Hematocrit                  33.1 (L)          36.0 - 46.0 %       MCV                         85                80 - 100 fL         MCH                         27.0              26.0 - 34.0 *       MCHC                         31.7 (L)          32.0 - 36.0 *       RDW                         16.4 (H)          11.5 - 14.5 %       Platelets                   167               150 - 450 x1*       Neutrophils %               65.4              40.0 - 80.0 %       Immature Granulocytes *     0.4               0.0 - 0.9 %         Lymphocytes %               20.1              13.0 - 44.0 %       Monocytes %                 12.9              2.0 - 10.0 %        Eosinophils %               1.0               0.0 - 6.0 %         Basophils %                 0.2               0.0 - 2.0 %         Neutrophils Absolute        6.48 (H)          1.60 - 5.50 *       Immature Granulocytes *     0.04              0.00 - 0.50 *       Lymphocytes Absolute        1.99              0.80 - 3.00 *       Monocytes Absolute          1.28 (H)          0.05 - 0.80 *       Eosinophils Absolute        0.10              0.00 - 0.40 *       Basophils Absolute          0.02              0.00 - 0.10 *  -Comprehensive metabolic panel:        Result                      Value             Ref Range           Glucose                     104 (H)           74 - 99 mg/dL       Sodium                      132 (L)           136 - 145 mm*       Potassium                   3.1 (L)           3.5 - 5.3 mm*       Chloride                    96 (L)            98 - 107 mmo*       Bicarbonate                 24                21 - 32 mmol*       Anion Gap                   15                10 - 20 mmol*       Urea Nitrogen               17                6 - 23 mg/dL        Creatinine                  0.63              0.50 - 1.05 *       eGFR                        83                >60 mL/min/1*       Calcium                     9.1               8.6 - 10.3 m*       Albumin                     3.4               3.4 - 5.0 g/*       Alkaline Phosphatase        71                33 - 136 U/L        Total Protein               6.8               6.4 - 8.2 g/*       AST                          24                9 - 39 U/L          Bilirubin, Total            0.9               0.0 - 1.2 mg*       ALT                         19                7 - 45 U/L     -Troponin I, High Sensitivity:        Result                      Value             Ref Range           Troponin I, High Sensi*     16 (H)            0 - 13 ng/L    -BLOOD GAS VENOUS FULL PANEL:        Result                      Value             Ref Range           POCT pH, Venous             7.43              7.33 - 7.43 *       POCT pCO2, Venous           37 (L)            41 - 51 mm Hg       POCT pO2, Venous            41                35 - 45 mm Hg       POCT SO2, Venous            51                45 - 75 %           POCT Oxy Hemoglobin, V*     50.5              45.0 - 75.0 %       POCT Hematocrit Calcul*     31.0 (L)          36.0 - 46.0 %       POCT Sodium, Venous         132 (L)           136 - 145 mm*       POCT Potassium, Venous      3.4 (L)           3.5 - 5.3 mm*       POCT Chloride, Venous       99                98 - 107 mmo*       POCT Ionized Calicum, *     1.19              1.10 - 1.33 *       POCT Glucose, Venous        120 (H)           74 - 99 mg/dL       POCT Lactate, Venous        1.7               0.4 - 2.0 mm*       POCT Base Excess, Veno*     0.4               -2.0 - 3.0 m*       POCT HCO3 Calculated, *     24.6              22.0 - 26.0 *       POCT Hemoglobin, Venous     10.4 (L)          12.0 - 16.0 *       POCT Anion Gap, Venous      12.0              10.0 - 25.0 *       Patient Temperature         37.0              degrees Cels*       FiO2                        21                %              XR chest 1 view   Final Result    1. Predominantly right-sided scattered pulmonary opacities, small    right-sided pleural effusion along with cardiomegaly. Finding could    be related to pulmonary edema, however other differential diagnosis    include infectious process or related to chronic lung disease.           Critical Finding:  See findings. Notification was initiated on    12/9/2023 at 10:40 am by  Darío Crowder.  (**-OCF-**) Instructions:                Signed by: Darío Crowder 12/9/2023 10:41 AM    Dictation workstation:   [id number]                               Kobe Coma Scale Score: 15                  Patient History   Past Medical History:   Diagnosis Date    Essential (primary) hypertension     Benign essential hypertension    Noninfective gastroenteritis and colitis, unspecified     Chronic colitis    Other conditions influencing health status     Bone Density Studies Dual-Energy X-ray Absorptiometry    Other conditions influencing health status     Substance Use Disorders    Other conditions influencing health status     Adenocarcinoma Of The Breast    Other conditions influencing health status 07/13/2017    History of cough    Shortness of breath 07/18/2022    Shortness of breath     Past Surgical History:   Procedure Laterality Date    COLONOSCOPY  04/23/2013    Complete Colonoscopy    LUMBAR LAMINECTOMY  05/06/2013    Laminectomy Lumbar    MASTECTOMY  05/06/2013    Breast Surgery Mastectomy    TOTAL ABDOMINAL HYSTERECTOMY W/ BILATERAL SALPINGOOPHORECTOMY  05/06/2013    Total Abdominal Hysterectomy With Removal Of Both Ovaries    TOTAL HIP ARTHROPLASTY  05/06/2013    Total Hip Replacement     No family history on file.  Social History     Tobacco Use    Smoking status: Never    Smokeless tobacco: Never   Vaping Use    Vaping Use: Never used   Substance Use Topics    Alcohol use: Not Currently    Drug use: Never       Physical Exam   ED Triage Vitals [12/09/23 0924]   Temp Heart Rate Resp BP   37.8 °C (100 °F) 80 20 151/77      SpO2 Temp Source Heart Rate Source Patient Position   98 % Oral -- --      BP Location FiO2 (%)     -- --       Physical Exam    ED Course & MDM   Diagnoses as of 12/09/23 1723   Community acquired pneumonia, unspecified laterality   Acute asthma exacerbation       Medical Decision  Making      Procedure  Procedures     Vishnu Rodriguez,   12/09/23 4815

## 2023-12-09 NOTE — PROGRESS NOTES
Pharmacy Medication History Review    Carine Luz is a 89 y.o. female admitted for Acute asthma exacerbation. Pharmacy reviewed the patient's [id number] medications and allergies for accuracy.    The list below reflectives the updated PTA list. Please review each medication in order reconciliation for additional clarification and justification.  Prior to Admission Medications   Prescriptions Last Dose Informant Patient Reported? Taking?   Eliquis 2.5 mg tablet   Yes No   Sig: Take 1 tablet (2.5 mg) by mouth 2 times a day.   Restasis 0.05 % ophthalmic emulsion   Yes No   Sig: Administer 1 drop into both eyes every 12 hours.   TURMERIC ORAL   Yes No   Sig: Take 1 capsule by mouth once daily.   acetaminophen (Tylenol) 325 mg tablet   Yes No   Sig: Take 2 tablets (650 mg) by mouth every 6 hours if needed for mild pain (1 - 3).   albuterol (ProAir RespiClick) 90 mcg/actuation aerosol powdr breath activated inhaler   Yes No   Sig: Inhale 2 puffs every 4 hours if needed for shortness of breath.   albuterol 2.5 mg /3 mL (0.083 %) nebulizer solution   Yes No   Sig: Inhale 3 mL 3 times a day as needed for wheezing.   albuterol 90 mcg/actuation inhaler   Yes No   Sig: Inhale 2 puffs every 4 hours if needed for shortness of breath.   atorvastatin (Lipitor) 80 mg tablet   No No   Sig: GIVE 1 TABLET BY MOUTH  DAILY AT BEDTIME   budesonide (Pulmicort) 0.5 mg/2 mL nebulizer solution   Yes No   Sig: Inhale 2 mL (0.5 mg) every 12 hours.   cetirizine (ZyrTEC) 10 mg tablet   Yes No   Sig: Take 1 tablet (10 mg) by mouth once daily.   cholecalciferol (Vitamin D-3) 25 MCG (1000 UT) tablet   Yes No   Sig: Take 3 tablets (75 mcg) by mouth once daily.   cyanocobalamin (Vitamin B-12) 1,000 mcg/mL injection   Yes No   Sig: Inject 1 mL (1,000 mcg) into the muscle every 30 (thirty) days.   dorzolamide-timoloL (Cosopt) 22.3-6.8 mg/mL ophthalmic solution   Yes No   Sig: Administer 1 drop into the left eye 2 times a day.    fluticasone propion-salmeteroL (Wixela Inhub) 100-50 mcg/dose diskus inhaler   Yes No   Sig: Inhale 1 puff 2 times a day. Rinse mouth with water after use to reduce aftertaste and incidence of candidiasis. Do not swallow.   furosemide (Lasix) 20 mg tablet   No No   Sig: GIVE 1 TABLET BY MOUTH  DAILY   guaiFENesin (Mucus Relief ER) 600 mg 12 hr tablet   No No   Sig: GIVE 1 TABLET BY MOUTH  TWICE DAILY   hydrOXYzine HCL (Atarax) 10 mg tablet   Yes No   Sig: Take 1 tablet (10 mg) by mouth once daily at bedtime.   latanoprost (Xalatan) 0.005 % ophthalmic solution   Yes No   Sig: Administer 1 drop into the left eye once daily.   metoprolol succinate XL (Toprol-XL) 50 mg 24 hr tablet   Yes No   Sig: Take 1 tablet (50 mg) by mouth once daily.   montelukast (Singulair) 10 mg tablet   No No   Sig: TIT BY MOUTH  DAILY   Patient taking differently: Take 1 tablet (10 mg) by mouth once daily.   nitrofurantoin, macrocrystal-monohydrate, (Macrobid) 100 mg capsule   No No   Sig: Take 1 capsule (100 mg) by mouth 2 times a day for 5 days.   omeprazole (PriLOSEC) 20 mg DR capsule   Yes No   Sig: Take 1 capsule (20 mg) by mouth once daily.             polyvinyl alcohol (Liquifilm Tears) 1.4 % ophthalmic solution   Yes No   Sig: Administer 1 drop into both eyes 3 times a day.   sertraline (Zoloft) 100 mg tablet   Yes No   Sig: Take 1 tablet (100 mg) by mouth once daily.   triamcinolone (Kenalog) 0.1 % cream   Yes No   Si Application 3 times a day.      Facility-Administered Medications: None           The list below reflectives the updated allergy list. Please review each documented allergy for additional clarification and justification.  Allergies  Reviewed by Florencai Piper PA-C on 2023        Severity Reactions Comments    Codeine Not Specified Unknown     Meperidine Not Specified Dermatitis     Morphine Not Specified Hallucinations, Unknown             Below are additional concerns with the patient's PTA  list.  Medication list sent from facility.    Damaris Lawson CPhT

## 2023-12-10 LAB
ANION GAP SERPL CALC-SCNC: 14 MMOL/L (ref 10–20)
BACTERIA UR CULT: NO GROWTH
BUN SERPL-MCNC: 16 MG/DL (ref 6–23)
CALCIUM SERPL-MCNC: 8.4 MG/DL (ref 8.6–10.3)
CHLORIDE SERPL-SCNC: 97 MMOL/L (ref 98–107)
CO2 SERPL-SCNC: 22 MMOL/L (ref 21–32)
CREAT SERPL-MCNC: 0.6 MG/DL (ref 0.5–1.05)
ERYTHROCYTE [DISTWIDTH] IN BLOOD BY AUTOMATED COUNT: 16.3 % (ref 11.5–14.5)
GFR SERPL CREATININE-BSD FRML MDRD: 84 ML/MIN/1.73M*2
GLUCOSE SERPL-MCNC: 99 MG/DL (ref 74–99)
HCT VFR BLD AUTO: 32 % (ref 36–46)
HGB BLD-MCNC: 9.8 G/DL (ref 12–16)
MCH RBC QN AUTO: 27.5 PG (ref 26–34)
MCHC RBC AUTO-ENTMCNC: 30.6 G/DL (ref 32–36)
MCV RBC AUTO: 90 FL (ref 80–100)
NRBC BLD-RTO: 0 /100 WBCS (ref 0–0)
PLATELET # BLD AUTO: 143 X10*3/UL (ref 150–450)
POTASSIUM SERPL-SCNC: 3.5 MMOL/L (ref 3.5–5.3)
RBC # BLD AUTO: 3.57 X10*6/UL (ref 4–5.2)
SODIUM SERPL-SCNC: 129 MMOL/L (ref 136–145)
WBC # BLD AUTO: 7.5 X10*3/UL (ref 4.4–11.3)

## 2023-12-10 PROCEDURE — 82374 ASSAY BLOOD CARBON DIOXIDE: CPT | Performed by: PHYSICIAN ASSISTANT

## 2023-12-10 PROCEDURE — 1100000001 HC PRIVATE ROOM DAILY

## 2023-12-10 PROCEDURE — 2500000004 HC RX 250 GENERAL PHARMACY W/ HCPCS (ALT 636 FOR OP/ED): Performed by: PHYSICIAN ASSISTANT

## 2023-12-10 PROCEDURE — 36415 COLL VENOUS BLD VENIPUNCTURE: CPT | Performed by: PHYSICIAN ASSISTANT

## 2023-12-10 PROCEDURE — 2500000004 HC RX 250 GENERAL PHARMACY W/ HCPCS (ALT 636 FOR OP/ED): Performed by: HOSPITALIST

## 2023-12-10 PROCEDURE — 94640 AIRWAY INHALATION TREATMENT: CPT

## 2023-12-10 PROCEDURE — 2500000002 HC RX 250 W HCPCS SELF ADMINISTERED DRUGS (ALT 637 FOR MEDICARE OP, ALT 636 FOR OP/ED): Performed by: PHYSICIAN ASSISTANT

## 2023-12-10 PROCEDURE — 2500000001 HC RX 250 WO HCPCS SELF ADMINISTERED DRUGS (ALT 637 FOR MEDICARE OP): Performed by: PHYSICIAN ASSISTANT

## 2023-12-10 PROCEDURE — 99232 SBSQ HOSP IP/OBS MODERATE 35: CPT | Performed by: INTERNAL MEDICINE

## 2023-12-10 PROCEDURE — 85027 COMPLETE CBC AUTOMATED: CPT | Performed by: PHYSICIAN ASSISTANT

## 2023-12-10 RX ORDER — HALOPERIDOL 5 MG/ML
2 INJECTION INTRAMUSCULAR ONCE
Status: COMPLETED | OUTPATIENT
Start: 2023-12-10 | End: 2023-12-10

## 2023-12-10 RX ADMIN — APIXABAN 2.5 MG: 2.5 TABLET, FILM COATED ORAL at 21:07

## 2023-12-10 RX ADMIN — METOPROLOL SUCCINATE 50 MG: 50 TABLET, EXTENDED RELEASE ORAL at 08:48

## 2023-12-10 RX ADMIN — SERTRALINE HYDROCHLORIDE 100 MG: 50 TABLET ORAL at 08:48

## 2023-12-10 RX ADMIN — CEFTRIAXONE SODIUM 1 G: 1 INJECTION, SOLUTION INTRAVENOUS at 11:34

## 2023-12-10 RX ADMIN — LORATADINE 10 MG: 10 TABLET ORAL at 08:48

## 2023-12-10 RX ADMIN — SENNOSIDES 17.2 MG: 8.6 TABLET, FILM COATED ORAL at 08:48

## 2023-12-10 RX ADMIN — APIXABAN 2.5 MG: 2.5 TABLET, FILM COATED ORAL at 08:48

## 2023-12-10 RX ADMIN — BUDESONIDE INHALATION 0.5 MG: 0.5 SUSPENSION RESPIRATORY (INHALATION) at 08:06

## 2023-12-10 RX ADMIN — FUROSEMIDE 20 MG: 20 TABLET ORAL at 08:48

## 2023-12-10 RX ADMIN — PANTOPRAZOLE SODIUM 40 MG: 40 TABLET, DELAYED RELEASE ORAL at 06:19

## 2023-12-10 RX ADMIN — IPRATROPIUM BROMIDE AND ALBUTEROL SULFATE 3 ML: 2.5; .5 SOLUTION RESPIRATORY (INHALATION) at 08:06

## 2023-12-10 RX ADMIN — BUDESONIDE INHALATION 0.5 MG: 0.5 SUSPENSION RESPIRATORY (INHALATION) at 19:54

## 2023-12-10 RX ADMIN — ATORVASTATIN CALCIUM 80 MG: 80 TABLET, FILM COATED ORAL at 21:07

## 2023-12-10 RX ADMIN — AZITHROMYCIN 500 MG: 500 TABLET, FILM COATED ORAL at 08:48

## 2023-12-10 RX ADMIN — SENNOSIDES 17.2 MG: 8.6 TABLET, FILM COATED ORAL at 21:10

## 2023-12-10 RX ADMIN — IPRATROPIUM BROMIDE AND ALBUTEROL SULFATE 3 ML: 2.5; .5 SOLUTION RESPIRATORY (INHALATION) at 19:53

## 2023-12-10 RX ADMIN — GUAIFENESIN 600 MG: 600 TABLET ORAL at 21:07

## 2023-12-10 RX ADMIN — GUAIFENESIN 600 MG: 600 TABLET ORAL at 08:48

## 2023-12-10 RX ADMIN — MONTELUKAST 10 MG: 10 TABLET, FILM COATED ORAL at 08:48

## 2023-12-10 RX ADMIN — HALOPERIDOL LACTATE 2 MG: 5 INJECTION, SOLUTION INTRAMUSCULAR at 22:38

## 2023-12-10 ASSESSMENT — COGNITIVE AND FUNCTIONAL STATUS - GENERAL
STANDING UP FROM CHAIR USING ARMS: A LOT
WALKING IN HOSPITAL ROOM: A LOT
DAILY ACTIVITIY SCORE: 18
TURNING FROM BACK TO SIDE WHILE IN FLAT BAD: A LITTLE
TOILETING: A LITTLE
EATING MEALS: A LITTLE
HELP NEEDED FOR BATHING: A LITTLE
PERSONAL GROOMING: A LITTLE
DRESSING REGULAR LOWER BODY CLOTHING: A LITTLE
MOVING TO AND FROM BED TO CHAIR: A LITTLE
DRESSING REGULAR UPPER BODY CLOTHING: A LITTLE
CLIMB 3 TO 5 STEPS WITH RAILING: A LOT
MOVING FROM LYING ON BACK TO SITTING ON SIDE OF FLAT BED WITH BEDRAILS: A LITTLE
MOBILITY SCORE: 15

## 2023-12-10 ASSESSMENT — PAIN - FUNCTIONAL ASSESSMENT: PAIN_FUNCTIONAL_ASSESSMENT: 0-10

## 2023-12-10 ASSESSMENT — PAIN SCALES - GENERAL: PAINLEVEL_OUTOF10: 0 - NO PAIN

## 2023-12-10 NOTE — PROGRESS NOTES
"Carine Luz is a 89 y.o. female on day 1 of admission presenting with Acute asthma exacerbation.    Subjective   No acute events overnight. Was pretty agitated concerned about going downstairs to eat with her friends.        Objective     VITALS  Blood pressure 134/75, pulse 106, temperature 36.7 °C (98.1 °F), temperature source Temporal, resp. rate 22, height 1.651 m (5' 5\"), weight 52.1 kg (114 lb 13.8 oz), SpO2 96 %.  Physical Exam  Vitals and nursing note reviewed.   Constitutional:       Appearance: She is normal weight.   HENT:      Head: Normocephalic and atraumatic.   Eyes:      Comments: Pupil of L eye completely white    Cardiovascular:      Rate and Rhythm: Normal rate and regular rhythm.      Heart sounds: Normal heart sounds.   Pulmonary:      Effort: Pulmonary effort is normal. No respiratory distress.      Breath sounds: Normal breath sounds.   Abdominal:      General: Abdomen is flat. Bowel sounds are normal. There is no distension.      Palpations: Abdomen is soft.   Musculoskeletal:         General: No swelling.   Skin:     Capillary Refill: Capillary refill takes less than 2 seconds.   Neurological:      General: No focal deficit present.      Mental Status: She is alert. Mental status is at baseline. She is disoriented.   Psychiatric:         Mood and Affect: Mood normal.         Behavior: Behavior normal.           Intake/Output last 3 Shifts:  I/O last 3 completed shifts:  In: 150 (2.9 mL/kg) [I.V.:50 (1 mL/kg); IV Piggyback:100]  Out: - (0 mL/kg)   Weight: 52.1 kg     Relevant Results  Results for orders placed or performed during the hospital encounter of 12/09/23 (from the past 24 hour(s))   Urinalysis with Reflex Microscopic   Result Value Ref Range    Color, Urine Yellow Straw, Yellow    Appearance, Urine Clear Clear    Specific Gravity, Urine 1.016 1.005 - 1.035    pH, Urine 5.0 5.0, 5.5, 6.0, 6.5, 7.0, 7.5, 8.0    Protein, Urine 30 (1+) (N) NEGATIVE mg/dL    Glucose, Urine " NEGATIVE NEGATIVE mg/dL    Blood, Urine NEGATIVE NEGATIVE    Ketones, Urine 5 (TRACE) (A) NEGATIVE mg/dL    Bilirubin, Urine NEGATIVE NEGATIVE    Urobilinogen, Urine <2.0 <2.0 mg/dL    Nitrite, Urine NEGATIVE NEGATIVE    Leukocyte Esterase, Urine SMALL (1+) (A) NEGATIVE   Microscopic Only, Urine   Result Value Ref Range    WBC, Urine 6-10 (A) 1-5, NONE /HPF    RBC, Urine 3-5 NONE, 1-2, 3-5 /HPF   Troponin I, High Sensitivity   Result Value Ref Range    Troponin I, High Sensitivity 17 (H) 0 - 13 ng/L   CBC   Result Value Ref Range    WBC 7.5 4.4 - 11.3 x10*3/uL    nRBC 0.0 0.0 - 0.0 /100 WBCs    RBC 3.57 (L) 4.00 - 5.20 x10*6/uL    Hemoglobin 9.8 (L) 12.0 - 16.0 g/dL    Hematocrit 32.0 (L) 36.0 - 46.0 %    MCV 90 80 - 100 fL    MCH 27.5 26.0 - 34.0 pg    MCHC 30.6 (L) 32.0 - 36.0 g/dL    RDW 16.3 (H) 11.5 - 14.5 %    Platelets 143 (L) 150 - 450 x10*3/uL   Basic metabolic panel   Result Value Ref Range    Glucose 99 74 - 99 mg/dL    Sodium 129 (L) 136 - 145 mmol/L    Potassium 3.5 3.5 - 5.3 mmol/L    Chloride 97 (L) 98 - 107 mmol/L    Bicarbonate 22 21 - 32 mmol/L    Anion Gap 14 10 - 20 mmol/L    Urea Nitrogen 16 6 - 23 mg/dL    Creatinine 0.60 0.50 - 1.05 mg/dL    eGFR 84 >60 mL/min/1.73m*2    Calcium 8.4 (L) 8.6 - 10.3 mg/dL       Imaging Results  Lower extremity venous duplex bilateral   Final Result   No evidence of DVT in the imaged bilateral lower extremities.             Signed by: Stephane Boyle 12/9/2023 4:58 PM   Dictation workstation:   [id number]      XR chest 1 view   Final Result   1. Predominantly right-sided scattered pulmonary opacities, small   right-sided pleural effusion along with cardiomegaly. Finding could   be related to pulmonary edema, however other differential diagnosis   include infectious process or related to chronic lung disease.        Critical Finding:  See findings. Notification was initiated on   12/9/2023 at 10:40 am by  Darío Crowder.  (**-OCF-**) Instructions:             Signed  by: Darío Crowder 12/9/2023 10:41 AM   Dictation workstation:   [id number]          Medications:  apixaban, 2.5 mg, oral, BID  atorvastatin, 80 mg, oral, Nightly  azithromycin, 500 mg, oral, q24h [place]  budesonide, 0.5 mg, nebulization, BID  cefTRIAXone, 1 g, intravenous, q24h  dorzolamide-timoloL, 1 drop, Left Eye, BID  furosemide, 20 mg, oral, Daily  guaiFENesin, 600 mg, oral, BID  ipratropium-albuteroL, 3 mL, nebulization, q6h while awake  loratadine, 10 mg, oral, Daily  metoprolol succinate XL, 50 mg, oral, Daily  montelukast, 10 mg, oral, Daily  pantoprazole, 40 mg, oral, Daily before breakfast  sennosides, 2 tablet, oral, BID  sertraline, 100 mg, oral, Daily       PRN medications: acetaminophen **OR** acetaminophen **OR** acetaminophen, albuterol, albuterol, ondansetron ODT **OR** ondansetron, oxygen        Assessment/Plan   Principal Problem:    Acute asthma exacerbation  Active Problems:    Asthma exacerbation    Pneumonia    Hypokalemia    Tachypnea    Hypoxemia requiring supplemental oxygen    Weakness generalized    Community Acquired PNA   -Continue with Azithromycin and Ceftriaxone  -Check strep and legionella antigens     Hypokalemia- Resolved    Hx of Afib   -Metoprolol   -Eliquis     Dementia  -Keep blinds open during the day lights off at night  -Will try to avoid any mind altering medications     Possible UTI  -Continue with Ceftriaxone  -Monitor cx     Detached retina   -Daughter concerned as eye is not completely white  -Not sure there is much to offer at this point     Acute hypoxic respiratory failure   -Wean O2 as tolerated for SpO2 >90        DVT Prophylaxis:  Eliquis    Disposition:  Suspect DC tomorrow if stable.     Sudhir Robles, DO  [facility]

## 2023-12-10 NOTE — CARE PLAN
The patient's goals for the shift include  getting up to the bathroom    The clinical goals for the shift include patient will remain free from injury    Over the shift, the patient did not make progress toward the following goals. Barriers to progression include ***. Recommendations to address these barriers include ***.

## 2023-12-11 LAB
ANION GAP SERPL CALC-SCNC: 11 MMOL/L (ref 10–20)
BUN SERPL-MCNC: 15 MG/DL (ref 6–23)
CALCIUM SERPL-MCNC: 8.6 MG/DL (ref 8.6–10.3)
CHLORIDE SERPL-SCNC: 101 MMOL/L (ref 98–107)
CO2 SERPL-SCNC: 25 MMOL/L (ref 21–32)
CREAT SERPL-MCNC: 0.57 MG/DL (ref 0.5–1.05)
ERYTHROCYTE [DISTWIDTH] IN BLOOD BY AUTOMATED COUNT: 16.2 % (ref 11.5–14.5)
GFR SERPL CREATININE-BSD FRML MDRD: 85 ML/MIN/1.73M*2
GLUCOSE SERPL-MCNC: 96 MG/DL (ref 74–99)
HCT VFR BLD AUTO: 30.9 % (ref 36–46)
HGB BLD-MCNC: 9.7 G/DL (ref 12–16)
MCH RBC QN AUTO: 26.7 PG (ref 26–34)
MCHC RBC AUTO-ENTMCNC: 31.4 G/DL (ref 32–36)
MCV RBC AUTO: 85 FL (ref 80–100)
NRBC BLD-RTO: 0 /100 WBCS (ref 0–0)
PLATELET # BLD AUTO: 163 X10*3/UL (ref 150–450)
POTASSIUM SERPL-SCNC: 2.9 MMOL/L (ref 3.5–5.3)
RBC # BLD AUTO: 3.63 X10*6/UL (ref 4–5.2)
SODIUM SERPL-SCNC: 134 MMOL/L (ref 136–145)
WBC # BLD AUTO: 6.9 X10*3/UL (ref 4.4–11.3)

## 2023-12-11 PROCEDURE — 2500000004 HC RX 250 GENERAL PHARMACY W/ HCPCS (ALT 636 FOR OP/ED): Performed by: INTERNAL MEDICINE

## 2023-12-11 PROCEDURE — 1100000001 HC PRIVATE ROOM DAILY

## 2023-12-11 PROCEDURE — 85027 COMPLETE CBC AUTOMATED: CPT | Performed by: PHYSICIAN ASSISTANT

## 2023-12-11 PROCEDURE — 2500000005 HC RX 250 GENERAL PHARMACY W/O HCPCS: Performed by: INTERNAL MEDICINE

## 2023-12-11 PROCEDURE — 80048 BASIC METABOLIC PNL TOTAL CA: CPT | Performed by: PHYSICIAN ASSISTANT

## 2023-12-11 PROCEDURE — 97161 PT EVAL LOW COMPLEX 20 MIN: CPT | Mod: GP

## 2023-12-11 PROCEDURE — 94760 N-INVAS EAR/PLS OXIMETRY 1: CPT

## 2023-12-11 PROCEDURE — 94640 AIRWAY INHALATION TREATMENT: CPT

## 2023-12-11 PROCEDURE — 97165 OT EVAL LOW COMPLEX 30 MIN: CPT | Mod: GO | Performed by: OCCUPATIONAL THERAPIST

## 2023-12-11 PROCEDURE — 36415 COLL VENOUS BLD VENIPUNCTURE: CPT | Performed by: PHYSICIAN ASSISTANT

## 2023-12-11 PROCEDURE — 2500000001 HC RX 250 WO HCPCS SELF ADMINISTERED DRUGS (ALT 637 FOR MEDICARE OP): Performed by: PHYSICIAN ASSISTANT

## 2023-12-11 PROCEDURE — 99232 SBSQ HOSP IP/OBS MODERATE 35: CPT | Performed by: INTERNAL MEDICINE

## 2023-12-11 PROCEDURE — 2500000002 HC RX 250 W HCPCS SELF ADMINISTERED DRUGS (ALT 637 FOR MEDICARE OP, ALT 636 FOR OP/ED): Performed by: PHYSICIAN ASSISTANT

## 2023-12-11 PROCEDURE — 2500000004 HC RX 250 GENERAL PHARMACY W/ HCPCS (ALT 636 FOR OP/ED): Performed by: PHYSICIAN ASSISTANT

## 2023-12-11 RX ORDER — POTASSIUM CHLORIDE 14.9 MG/ML
20 INJECTION INTRAVENOUS
Status: DISPENSED | OUTPATIENT
Start: 2023-12-11 | End: 2023-12-11

## 2023-12-11 RX ORDER — POTASSIUM CHLORIDE 14.9 MG/ML
20 INJECTION INTRAVENOUS ONCE
Status: COMPLETED | OUTPATIENT
Start: 2023-12-11 | End: 2023-12-11

## 2023-12-11 RX ADMIN — SERTRALINE HYDROCHLORIDE 100 MG: 50 TABLET ORAL at 08:58

## 2023-12-11 RX ADMIN — POTASSIUM CHLORIDE 20 MEQ: 14.9 INJECTION, SOLUTION INTRAVENOUS at 08:59

## 2023-12-11 RX ADMIN — DORZOLAMIDE HYDROCHLORIDE AND TIMOLOL MALEATE 1 DROP: 20; 5 SOLUTION/ DROPS OPHTHALMIC at 08:59

## 2023-12-11 RX ADMIN — MONTELUKAST 10 MG: 10 TABLET, FILM COATED ORAL at 08:58

## 2023-12-11 RX ADMIN — GUAIFENESIN 600 MG: 600 TABLET ORAL at 21:09

## 2023-12-11 RX ADMIN — CEFTRIAXONE SODIUM 1 G: 1 INJECTION, SOLUTION INTRAVENOUS at 11:59

## 2023-12-11 RX ADMIN — DORZOLAMIDE HYDROCHLORIDE AND TIMOLOL MALEATE 1 DROP: 20; 5 SOLUTION/ DROPS OPHTHALMIC at 21:09

## 2023-12-11 RX ADMIN — IPRATROPIUM BROMIDE AND ALBUTEROL SULFATE 3 ML: 2.5; .5 SOLUTION RESPIRATORY (INHALATION) at 14:00

## 2023-12-11 RX ADMIN — GUAIFENESIN 600 MG: 600 TABLET ORAL at 08:58

## 2023-12-11 RX ADMIN — AZITHROMYCIN 500 MG: 500 TABLET, FILM COATED ORAL at 08:58

## 2023-12-11 RX ADMIN — FUROSEMIDE 20 MG: 20 TABLET ORAL at 08:58

## 2023-12-11 RX ADMIN — ATORVASTATIN CALCIUM 80 MG: 80 TABLET, FILM COATED ORAL at 21:09

## 2023-12-11 RX ADMIN — POTASSIUM CHLORIDE 20 MEQ: 14.9 INJECTION, SOLUTION INTRAVENOUS at 13:39

## 2023-12-11 RX ADMIN — SENNOSIDES 17.2 MG: 8.6 TABLET, FILM COATED ORAL at 21:09

## 2023-12-11 RX ADMIN — BUDESONIDE INHALATION 0.5 MG: 0.5 SUSPENSION RESPIRATORY (INHALATION) at 19:43

## 2023-12-11 RX ADMIN — LORATADINE 10 MG: 10 TABLET ORAL at 08:58

## 2023-12-11 RX ADMIN — APIXABAN 2.5 MG: 2.5 TABLET, FILM COATED ORAL at 21:09

## 2023-12-11 RX ADMIN — PANTOPRAZOLE SODIUM 40 MG: 40 TABLET, DELAYED RELEASE ORAL at 08:58

## 2023-12-11 RX ADMIN — METOPROLOL SUCCINATE 50 MG: 50 TABLET, EXTENDED RELEASE ORAL at 08:58

## 2023-12-11 RX ADMIN — APIXABAN 2.5 MG: 2.5 TABLET, FILM COATED ORAL at 08:58

## 2023-12-11 RX ADMIN — IPRATROPIUM BROMIDE AND ALBUTEROL SULFATE 3 ML: 2.5; .5 SOLUTION RESPIRATORY (INHALATION) at 19:43

## 2023-12-11 ASSESSMENT — PAIN - FUNCTIONAL ASSESSMENT
PAIN_FUNCTIONAL_ASSESSMENT: 0-10
PAIN_FUNCTIONAL_ASSESSMENT: 0-10

## 2023-12-11 ASSESSMENT — COGNITIVE AND FUNCTIONAL STATUS - GENERAL
PERSONAL GROOMING: A LITTLE
HELP NEEDED FOR BATHING: A LOT
CLIMB 3 TO 5 STEPS WITH RAILING: TOTAL
MOVING TO AND FROM BED TO CHAIR: A LOT
DAILY ACTIVITIY SCORE: 13
STANDING UP FROM CHAIR USING ARMS: A LOT
DRESSING REGULAR UPPER BODY CLOTHING: A LITTLE
STANDING UP FROM CHAIR USING ARMS: A LOT
MOVING FROM LYING ON BACK TO SITTING ON SIDE OF FLAT BED WITH BEDRAILS: A LOT
DRESSING REGULAR LOWER BODY CLOTHING: TOTAL
TURNING FROM BACK TO SIDE WHILE IN FLAT BAD: A LOT
CLIMB 3 TO 5 STEPS WITH RAILING: TOTAL
TURNING FROM BACK TO SIDE WHILE IN FLAT BAD: A LOT
MOBILITY SCORE: 11
MOVING TO AND FROM BED TO CHAIR: A LOT
MOVING FROM LYING ON BACK TO SITTING ON SIDE OF FLAT BED WITH BEDRAILS: A LOT
DRESSING REGULAR UPPER BODY CLOTHING: A LOT
PERSONAL GROOMING: A LOT
TOILETING: TOTAL
DAILY ACTIVITIY SCORE: 13
WALKING IN HOSPITAL ROOM: A LOT
HELP NEEDED FOR BATHING: A LOT
MOBILITY SCORE: 11
DRESSING REGULAR LOWER BODY CLOTHING: A LOT
TOILETING: A LOT
EATING MEALS: A LITTLE
WALKING IN HOSPITAL ROOM: A LOT
EATING MEALS: A LITTLE

## 2023-12-11 ASSESSMENT — ACTIVITIES OF DAILY LIVING (ADL): ADL_ASSISTANCE: INDEPENDENT

## 2023-12-11 ASSESSMENT — PAIN SCALES - GENERAL
PAINLEVEL_OUTOF10: 0 - NO PAIN

## 2023-12-11 NOTE — PROGRESS NOTES
"Carine Luz is a 89 y.o. female on day 2 of admission presenting with Acute asthma exacerbation.    Subjective   No acute events overnight. Doing well today, still very confused. Discussed with daughter at the bedside. Will work on seeing if she needs O2 going home.        Objective     VITALS  Blood pressure 123/81, pulse 79, temperature 36.5 °C (97.7 °F), temperature source Oral, resp. rate 16, height 1.651 m (5' 5\"), weight 52.1 kg (114 lb 13.8 oz), SpO2 93 %.  Physical Exam  Vitals and nursing note reviewed.   Constitutional:       Appearance: She is normal weight.   HENT:      Head: Normocephalic and atraumatic.   Eyes:      Comments: Pupil of L eye completely white    Cardiovascular:      Rate and Rhythm: Normal rate and regular rhythm.      Heart sounds: Normal heart sounds.   Pulmonary:      Effort: Pulmonary effort is normal. No respiratory distress.      Breath sounds: Normal breath sounds.   Abdominal:      General: Abdomen is flat. Bowel sounds are normal. There is no distension.      Palpations: Abdomen is soft.   Musculoskeletal:         General: No swelling.   Skin:     Capillary Refill: Capillary refill takes less than 2 seconds.   Neurological:      General: No focal deficit present.      Mental Status: She is alert. Mental status is at baseline. She is disoriented.   Psychiatric:         Mood and Affect: Mood normal.         Behavior: Behavior normal.           Intake/Output last 3 Shifts:  I/O last 3 completed shifts:  In: 360 (6.9 mL/kg) [P.O.:360]  Out: 151 (2.9 mL/kg) [Urine:151 (0.1 mL/kg/hr)]  Weight: 52.1 kg     Relevant Results  Results for orders placed or performed during the hospital encounter of 12/09/23 (from the past 24 hour(s))   CBC   Result Value Ref Range    WBC 6.9 4.4 - 11.3 x10*3/uL    nRBC 0.0 0.0 - 0.0 /100 WBCs    RBC 3.63 (L) 4.00 - 5.20 x10*6/uL    Hemoglobin 9.7 (L) 12.0 - 16.0 g/dL    Hematocrit 30.9 (L) 36.0 - 46.0 %    MCV 85 80 - 100 fL    MCH 26.7 26.0 - " 34.0 pg    MCHC 31.4 (L) 32.0 - 36.0 g/dL    RDW 16.2 (H) 11.5 - 14.5 %    Platelets 163 150 - 450 x10*3/uL   Basic metabolic panel   Result Value Ref Range    Glucose 96 74 - 99 mg/dL    Sodium 134 (L) 136 - 145 mmol/L    Potassium 2.9 (LL) 3.5 - 5.3 mmol/L    Chloride 101 98 - 107 mmol/L    Bicarbonate 25 21 - 32 mmol/L    Anion Gap 11 10 - 20 mmol/L    Urea Nitrogen 15 6 - 23 mg/dL    Creatinine 0.57 0.50 - 1.05 mg/dL    eGFR 85 >60 mL/min/1.73m*2    Calcium 8.6 8.6 - 10.3 mg/dL       Imaging Results  Lower extremity venous duplex bilateral   Final Result   No evidence of DVT in the imaged bilateral lower extremities.             Signed by: Stephane Boyle 12/9/2023 4:58 PM   Dictation workstation:   [id number]      XR chest 1 view   Final Result   1. Predominantly right-sided scattered pulmonary opacities, small   right-sided pleural effusion along with cardiomegaly. Finding could   be related to pulmonary edema, however other differential diagnosis   include infectious process or related to chronic lung disease.        Critical Finding:  See findings. Notification was initiated on   12/9/2023 at 10:40 am by  Darío Crowder.  (**-OCF-**) Instructions:             Signed by: Darío Crowder 12/9/2023 10:41 AM   Dictation workstation:   [id number]          Medications:  apixaban, 2.5 mg, oral, BID  atorvastatin, 80 mg, oral, Nightly  azithromycin, 500 mg, oral, q24h [place]  budesonide, 0.5 mg, nebulization, BID  cefTRIAXone, 1 g, intravenous, q24h  dorzolamide-timoloL, 1 drop, Left Eye, BID  furosemide, 20 mg, oral, Daily  guaiFENesin, 600 mg, oral, BID  ipratropium-albuteroL, 3 mL, nebulization, q6h while awake  loratadine, 10 mg, oral, Daily  metoprolol succinate XL, 50 mg, oral, Daily  montelukast, 10 mg, oral, Daily  pantoprazole, 40 mg, oral, Daily before breakfast  potassium chloride, 20 mEq, intravenous, Once  sennosides, 2 tablet, oral, BID  sertraline, 100 mg, oral, Daily       PRN medications: acetaminophen  **OR** acetaminophen **OR** acetaminophen, albuterol, albuterol, ondansetron ODT **OR** ondansetron, oxygen        Assessment/Plan   Principal Problem:    Acute asthma exacerbation  Active Problems:    Asthma exacerbation    Pneumonia    Hypokalemia    Tachypnea    Hypoxemia requiring supplemental oxygen    Weakness generalized    Community Acquired PNA   -Continue with Azithromycin and Ceftriaxone  -Check strep and legionella antigens     Hypokalemia- Resolved    Hx of Afib   -Metoprolol   -Eliquis     Dementia  -Keep blinds open during the day lights off at night  -Will try to avoid any mind altering medications     Possible UTI  -Continue with Ceftriaxone  -Monitor cx     Detached retina   -Daughter concerned as eye is not completely white  -Not sure there is much to offer at this point     Acute hypoxic respiratory failure   -Wean O2 as tolerated for SpO2 >90        DVT Prophylaxis:  Eliquis    Disposition:  Will have resp therapy eval for possible home going O2 needs. Likely home tomorrow.     Sudhir Robles, DO  Hospital Medicine       No

## 2023-12-11 NOTE — PROGRESS NOTES
Occupational Therapy    Evaluation    Patient Name: Carine Luz  MRN: [id number]  Today's Date: 12/11/2023  Time Calculation  Start Time: 0950  Stop Time: 1012  Time Calculation (min): 22 min        Assessment:  OT Assessment: Pt demos decreased balance, strength, endurance, ROM and cognition/safety awareness resulting in decreased safety and independence with ADLs/IADLs. Pt requires skilled OT services to address above deficits to safely return to PLOF.  Prognosis: Good  Evaluation/Treatment Tolerance: Patient limited by fatigue  Medical Staff Made Aware: Yes  End of Session Communication: Bedside nurse  End of Session Patient Position: Up in chair, Alarm on (all needs in reach)  OT Assessment Results: Decreased ADL status, Decreased upper extremity strength, Decreased safe judgment during ADL, Decreased cognition, Decreased endurance, Decreased functional mobility, Decreased IADLs, Decreased trunk control for functional activities  Prognosis: Good  Evaluation/Treatment Tolerance: Patient limited by fatigue  Medical Staff Made Aware: Yes  Strengths: Housing layout  Barriers to Participation: Comorbidities, Insight into problems  Plan:  Treatment Interventions: ADL retraining, Functional transfer training, UE strengthening/ROM, Endurance training, Cognitive reorientation, Patient/family training, Equipment evaluation/education, Compensatory technique education  OT Frequency: 3 times per week  OT Discharge Recommendations: Moderate intensity level of continued care  OT - OK to Discharge: Yes (OT POC established this date)  Treatment Interventions: ADL retraining, Functional transfer training, UE strengthening/ROM, Endurance training, Cognitive reorientation, Patient/family training, Equipment evaluation/education, Compensatory technique education    Subjective     General:  General  Reason for Referral: Pt is a 89 y/o female admitted from [facility] with SOB and hypoxia, early PNA, and acute asthma  exacerbation.  Referred By: Feliz CAMPUZANO)  Past Medical History Relevant to Rehab: dementia, CHF, asthma, breast CA, laminectomy, THR, anxiety  Family/Caregiver Present: No  Co-Treatment: PT  Co-Treatment Reason: to maximize safety and participation with skilled intervention  Prior to Session Communication: Bedside nurse  Patient Position Received: Bed, 3 rail up, Alarm on  Preferred Learning Style: verbal, visual  General Comment: Pt supine in bed upon arrival and agreeable to OT eval/tx. Pt pleasantly confused. Pt is a questionable historian.  Precautions:  Medical Precautions: Fall precautions, Oxygen therapy device and L/min  Vital Signs:  Heart Rate: 86  SpO2: 96 %  Pain:  Pain Assessment  Pain Assessment: 0-10  Pain Score: 0 - No pain    Objective   Cognition:  Overall Cognitive Status: Impaired at baseline  Orientation Level: Disoriented to place, Disoriented to time           Home Living:  Type of Home: Assisted living  Home Adaptive Equipment: None  Home Living Comments: Pt reports having tub shower with shower chair and grab bars, standard height toilet. Pt is a questionable historian.  Prior Function:  Level of [place]: Independent with ADLs and functional transfers, Independent with homemaking with ambulation  ADL Assistance: Independent  Homemaking Assistance: Independent  Ambulatory Assistance: Independent  Prior Function Comments: Pt reports Mod I with ADLs/IADLs and functional mobility using no AD at baseline. + driving. Pt denies recent falls. Pt is a questionable historian.       ADL:  Grooming Assistance: Stand by  Grooming Deficit: Brushing hair  UE Dressing Assistance: Minimal  UE Dressing Deficit: Thread LUE, Verbal cueing, Supervision/safety (to don/doff [facility] gow)  LE Dressing Assistance: Total  LE Dressing Deficit: Don/doff R sock, Don/doff L sock  Toileting Assistance with Device: Total  Toileting Deficit: Perineal hygiene (standing using FWW)  Activity Tolerance:  Endurance:  Tolerates less than 10 min exercise, no significant change in vital signs  Bed Mobility/Transfers: Bed Mobility  Bed Mobility: Yes  Bed Mobility 1  Bed Mobility 1: Supine to sitting  Level of Assistance 1: Maximum assistance, Maximum verbal cues, Moderate tactile cues  Bed Mobility Comments 1: HOB elevated, cues for sequencing, initiation and UE placement on bed rail, cues for maximal effort throughout    Transfers  Transfer: Yes  Transfer 1  Technique 1: Sit to stand, Stand to sit  Transfer Device 1: Walker  Transfer Level of Assistance 1: Moderate assistance, +2, Moderate verbal cues, Moderate tactile cues  Trials/Comments 1: from EOB, cues for sequencing, safe hand placement and walker safety  Transfers 2  Transfer From 2: Bed to  Transfer to 2: Chair with arms  Technique 2: Stand pivot  Transfer Device 2: Walker  Transfer Level of Assistance 2: Minimum assistance, +2, Moderate verbal cues  Trials/Comments 2: Min A x2 for side stepping to R side along EOB and stand pivot transfer from EOB>chair using FWW, cues for sequencing, upright posture and walker safety, pt demos flexed posturing throughout, assist for safe walker management and balance    Sitting Balance:  Static Sitting Balance  Static Sitting-Balance Support: Bilateral upper extremity supported  Static Sitting-Level of Assistance: Close supervision  Static Sitting-Comment/Number of Minutes: at EOB  Standing Balance:  Static Standing Balance  Static Standing-Balance Support: Bilateral upper extremity supported  Static Standing-Level of Assistance: Minimum assistance (x2 person assist)  Static Standing-Comment/Number of Minutes: using FWW, cues for upright posture d/t flexed posturing noted        Strength:  Strength Comments: B UEs grossly 3/5 based on function       Extremities: RUE   RUE : Within Functional Limits and LUE   LUE: Within Functional Limits    Outcome Measures:[facility] Daily Activity  Putting on and taking off regular lower body clothing:  Total  Bathing (including washing, rinsing, drying): A lot  Putting on and taking off regular upper body clothing: A little  Toileting, which includes using toilet, bedpan or urinal: Total  Taking care of personal grooming such as brushing teeth: A little  Eating Meals: A little  Daily Activity - Total Score: 13        Education Documentation  Body Mechanics, taught by Jaja Donis OT at 12/11/2023 11:08 AM.  Learner: Patient  Readiness: Acceptance  Method: Explanation  Response: Verbalizes Understanding, Needs Reinforcement    Precautions, taught by Jaja Donis OT at 12/11/2023 11:08 AM.  Learner: Patient  Readiness: Acceptance  Method: Explanation  Response: Verbalizes Understanding, Needs Reinforcement    ADL Training, taught by Jaja Donis OT at 12/11/2023 11:08 AM.  Learner: Patient  Readiness: Acceptance  Method: Explanation  Response: Verbalizes Understanding, Needs Reinforcement    Education Comments  No comments found.           Goals:  Encounter Problems       Encounter Problems (Active)       ADLs       Patient will perform UB bathing with SBA and LB bathing with minimal assist  level of assistance and grab bars, shower chair, and long-handled sponge.       Start:  12/11/23    Expected End:  12/25/23            Patient with complete upper body dressing with modified independent level of assistance donning and doffing all UE clothes with PRN adaptive equipment.       Start:  12/11/23    Expected End:  12/25/23            Patient with complete lower body dressing with minimal assist  level of assistance donning and doffing all LE clothes  with PRN adaptive equipment.       Start:  12/11/23    Expected End:  12/25/23            Patient will complete all daily grooming tasks with modified independent level of assistance and PRN adaptive equipment.       Start:  12/11/23    Expected End:  12/25/23            Patient will complete toileting including hygiene clothing management/hygiene with minimal  assist  level of assistance and raised toilet seat and grab bars.       Start:  12/11/23    Expected End:  12/25/23               BALANCE       Patient will tolerate standing for >/= 3 minutes to contact guard assist level of assistance with least restrictive device in order to improve functional activity tolerance for ADL tasks.       Start:  12/11/23    Expected End:  12/25/23                   TRANSFERS       Patient will perform bed mobility minimal assist  level of assistance and bed rails in order to improve safety and independence with mobility       Start:  12/11/23    Expected End:  12/25/23            Patient will complete all functional transfers with least restrictive device with contact guard assist level of assistance.       Start:  12/11/23    Expected End:  12/25/23

## 2023-12-11 NOTE — PROGRESS NOTES
Physical Therapy    Physical Therapy Evaluation    Patient Name: Carine Luz  MRN: [id number]  Today's Date: 12/11/2023   Time Calculation  Start Time: 0949  Stop Time: 1011  Time Calculation (min): 22 min    Assessment/Plan   PT Assessment  PT Assessment Results: Decreased strength, Decreased endurance, Impaired balance, Decreased mobility, Decreased cognition  Rehab Prognosis: Good  Evaluation/Treatment Tolerance: Patient limited by fatigue  Medical Staff Made Aware: Yes  Strengths: Housing layout  End of Session Communication: Bedside nurse  Assessment Comment: Pt presents with weakness, decreased ambulation and transfers, and moderate unsteadiness; can benefit from skilled PT intervention to assist with discharge planning and address the aforementioned issues to enable the pt to return to their prior level of function, which was independent per pt.  End of Session Patient Position: Up in chair, Alarm on  IP OR SWING BED PT PLAN  Inpatient or Swing Bed: Inpatient  PT Plan  Treatment/Interventions: Bed mobility, Transfer training, Gait training, Balance training, Neuromuscular re-education, Strengthening, Therapeutic exercise, Therapeutic activity, Home exercise program  PT Plan: Skilled PT  PT Frequency: 3 times per week  PT Recommended Transfer Status: Assist x2  PT - OK to Discharge: Yes (PT POC established)      Subjective   General Visit Information:  General  Reason for Referral: Pt admitted from [facility] with SOB and hypoxia, early PNA, and acute asthma exacerbation.  Past Medical History Relevant to Rehab: dementia, CHF, asthma, breast CA, laminectomy, THR, anxiety  Family/Caregiver Present: No  Co-Treatment: OT  Co-Treatment Reason: to maximize mobility and safety  Prior to Session Communication: Bedside nurse  Patient Position Received: Bed, 3 rail up, Alarm on  Preferred Learning Style: verbal, kinesthetic  General Comment: Pt is pleasantly confused, questionable historian. Denies any recent  falls.  Home Living:  Home Living  Type of Home: Assisted living  Home Adaptive Equipment: None  Prior Level of Function:  Prior Function Per Pt/Caregiver Report  Level of [place]: Independent with homemaking with ambulation  Ambulatory Assistance: Independent  Prior Function Comments: Pt reports not using and AD, and still driving. Unsure of accuracy of statement as pt has dementia.  Precautions:  Precautions  Medical Precautions: Fall precautions    Objective   Pain:  Pain Assessment  Pain Assessment: 0-10  Pain Score: 0 - No pain  Cognition:  Cognition  Overall Cognitive Status: Impaired at baseline  Orientation Level: Disoriented to place, Disoriented to time    General Assessments:  General Observation  General Observation: Pt appears to weaker than baseline, requires assist x 2 for mobility.     Activity Tolerance  Endurance: Tolerates less than 10 min exercise, no significant change in vital signs    Static Sitting Balance  Static Sitting-Balance Support: Feet supported, Bilateral upper extremity supported  Static Sitting-Level of Assistance: Close supervision  Dynamic Sitting Balance  Dynamic Sitting-Balance Support: Feet supported, No upper extremity supported  Dynamic Sitting-Balance:  (Upper body dressing)  Dynamic Sitting-Comments: CGA    Static Standing Balance  Static Standing-Balance Support: Bilateral upper extremity supported  Static Standing-Level of Assistance: Minimum assistance (x 2 person)  Dynamic Standing Balance  Dynamic Standing-Balance Support: Bilateral upper extremity supported  Dynamic Standing-Balance: Turning  Dynamic Standing-Comments: min A x 2  Functional Assessments:  Bed Mobility  Bed Mobility: Yes  Bed Mobility 1  Bed Mobility 1: Supine to sitting  Level of Assistance 1: Maximum assistance, Maximum verbal cues, Moderate tactile cues  Bed Mobility Comments 1: Pt educated in bed mobility technique moving supine->sit via sidelying; pt requires max VC's for technique and proper  UE placements for upper body initiate rolling and to use L UE to reach over for opposite bedrail, and to use BUE to push up into sitting from sidelying.    Transfers  Transfer: Yes  Transfer 1  Technique 1: Sit to stand, Stand to sit  Transfer Device 1: Walker  Transfer Level of Assistance 1: Moderate assistance, +2, Moderate verbal cues, Moderate tactile cues (hand-over-hand required for proper hand placement)    Ambulation/Gait Training  Ambulation/Gait Training Performed: Yes  Ambulation/Gait Training 1  Surface 1: Level tile  Device 1: Rolling walker  Assistance 1: Minimum assistance, Minimal verbal cues, Minimal tactile cues (x 2 person)  Quality of Gait 1: Forward flexed posture, Decreased step length (flexed knees)  Comments/Distance (ft) 1: 4' sidestep, 1' to chair  Extremity/Trunk Assessments:  RLE   RLE : Exceptions to WFL  Strength RLE  RLE Overall Strength:  (3+/5)  LLE   LLE : Exceptions to WFL  Strength LLE  LLE Overall Strength:  (3+/5)  Outcome Measures:  [facility] Basic Mobility  Turning from your back to your side while in a flat bed without using bedrails: A lot  Moving from lying on your back to sitting on the side of a flat bed without using bedrails: A lot  Moving to and from bed to chair (including a wheelchair): A lot  Standing up from a chair using your arms (e.g. wheelchair or bedside chair): A lot  To walk in hospital room: A lot  Climbing 3-5 steps with railing: Total  Basic Mobility - Total Score: 11    Encounter Problems       Encounter Problems (Active)       Balance       STG - Maintains static standing balance with upper extremity support x 3 minutes with CGA       Start:  12/11/23    Expected End:  12/25/23       INTERVENTIONS:  1. Practice standing with minimal support.  2. Educate patient about standing tolerance.  3. Educate patient about independence with gait, transfers, and ADL's.  4. Educate patient about use of assistive device.  5. Educate patient about self-directed care.          STG - Maintains dynamic sitting balance without upper extremity support x 3 minutes with supervision       Start:  12/11/23    Expected End:  12/25/23       INTERVENTIONS:  1. Practice sitting on the edge of a bed/mat with minimal support.  2. Educate patient about maintining total hip precautions while maintaining balance.  3. Educate patient about pressure relief.  4. Educate patient about use of assistive device.            Mobility       STG - Patient will ambulate with ww x 50' x 2 with min A       Start:  12/11/23    Expected End:  12/25/23               Pain - Adult          Transfers       STG - Patient to transfer to and from sit to supine with min A       Start:  12/11/23    Expected End:  12/25/23            STG - Patient will transfer sit to and from stand to ww with min A       Start:  12/11/23    Expected End:  12/25/23                   Education Documentation  Body Mechanics, taught by Rina Perla, PT at 12/11/2023 10:48 AM.  Learner: Patient  Readiness: Acceptance  Method: Explanation  Response: Demonstrated Understanding, Needs Reinforcement    Home Exercise Program, taught by Rina Perla, PT at 12/11/2023 10:48 AM.  Learner: Patient  Readiness: Acceptance  Method: Explanation  Response: Demonstrated Understanding, Needs Reinforcement    Mobility Training, taught by Rina Perla, PT at 12/11/2023 10:48 AM.  Learner: Patient  Readiness: Acceptance  Method: Explanation  Response: Demonstrated Understanding, Needs Reinforcement    Education Comments  No comments found.

## 2023-12-11 NOTE — PROGRESS NOTES
"   12/11/23 1437   Discharge Planning   Living Arrangements Alone   Support Systems Children   Assistance Needed relies on assistance for iADLs and some ADLs   Type of Residence Assisted living   Home or Post Acute Services In home services   Type of Home Care Services Home OT;Home PT   Patient expects to be discharged to: AL vs SNF   Does the patient need discharge transport arranged? Yes   RoundTrip coordination needed? Yes   Has discharge transport been arranged? Yes     Met with patient at bedside  Explained role of TCC  Patient states she lives in an apartment, states that she CAN get pt/ot if needed there, says that they CAN help with meds if needed, does not wear o2, is currently on supplemental o2, wants to return to her apartment at [place].     1430- met with daughter and granddaughter at bedside, explained role of TCC, daughter states that patient lives in [facility] assisted living, they provide all her meals, and meds, have pt/ot avail on site in an \"exercise\" room, state that patient uses walker and takes herself to meals, independent with personal care in her room, if she does not wish to go to dining room her meals will be delivered to her room. Family made aware that pt/ot evals have been sent to DON at [facility], her name is Betty  faX [phone], and Betty will contact this TCC after review, family member states that patients daughter Rachel [phone] is the family spokes person and to contact her after we hear back from [facility] regarding pt/ot eval, briefly explained SNF vs AL. Dc plan tbd at this time, family also aware that ADOD is tomorrow and hope to wean patient off o2 by then.   "

## 2023-12-12 VITALS
TEMPERATURE: 98.3 F | HEIGHT: 65 IN | RESPIRATION RATE: 18 BRPM | BODY MASS INDEX: 19.14 KG/M2 | OXYGEN SATURATION: 91 % | HEART RATE: 82 BPM | WEIGHT: 114.86 LBS | DIASTOLIC BLOOD PRESSURE: 76 MMHG | SYSTOLIC BLOOD PRESSURE: 119 MMHG

## 2023-12-12 LAB
ANION GAP SERPL CALC-SCNC: 12 MMOL/L (ref 10–20)
BUN SERPL-MCNC: 12 MG/DL (ref 6–23)
CALCIUM SERPL-MCNC: 8.3 MG/DL (ref 8.6–10.3)
CHLORIDE SERPL-SCNC: 101 MMOL/L (ref 98–107)
CO2 SERPL-SCNC: 22 MMOL/L (ref 21–32)
CREAT SERPL-MCNC: 0.55 MG/DL (ref 0.5–1.05)
ERYTHROCYTE [DISTWIDTH] IN BLOOD BY AUTOMATED COUNT: 16.4 % (ref 11.5–14.5)
GFR SERPL CREATININE-BSD FRML MDRD: 86 ML/MIN/1.73M*2
GLUCOSE SERPL-MCNC: 81 MG/DL (ref 74–99)
HCT VFR BLD AUTO: 32.8 % (ref 36–46)
HGB BLD-MCNC: 10.2 G/DL (ref 12–16)
MCH RBC QN AUTO: 27.2 PG (ref 26–34)
MCHC RBC AUTO-ENTMCNC: 31.1 G/DL (ref 32–36)
MCV RBC AUTO: 88 FL (ref 80–100)
NRBC BLD-RTO: 0 /100 WBCS (ref 0–0)
PLATELET # BLD AUTO: 203 X10*3/UL (ref 150–450)
POTASSIUM SERPL-SCNC: 3.8 MMOL/L (ref 3.5–5.3)
RBC # BLD AUTO: 3.75 X10*6/UL (ref 4–5.2)
SODIUM SERPL-SCNC: 131 MMOL/L (ref 136–145)
WBC # BLD AUTO: 8.1 X10*3/UL (ref 4.4–11.3)

## 2023-12-12 PROCEDURE — 80048 BASIC METABOLIC PNL TOTAL CA: CPT | Performed by: PHYSICIAN ASSISTANT

## 2023-12-12 PROCEDURE — 85027 COMPLETE CBC AUTOMATED: CPT | Performed by: PHYSICIAN ASSISTANT

## 2023-12-12 PROCEDURE — 99232 SBSQ HOSP IP/OBS MODERATE 35: CPT | Performed by: INTERNAL MEDICINE

## 2023-12-12 PROCEDURE — 36415 COLL VENOUS BLD VENIPUNCTURE: CPT | Performed by: PHYSICIAN ASSISTANT

## 2023-12-12 PROCEDURE — 2500000004 HC RX 250 GENERAL PHARMACY W/ HCPCS (ALT 636 FOR OP/ED): Performed by: PHYSICIAN ASSISTANT

## 2023-12-12 PROCEDURE — 2500000002 HC RX 250 W HCPCS SELF ADMINISTERED DRUGS (ALT 637 FOR MEDICARE OP, ALT 636 FOR OP/ED): Performed by: INTERNAL MEDICINE

## 2023-12-12 PROCEDURE — 2500000004 HC RX 250 GENERAL PHARMACY W/ HCPCS (ALT 636 FOR OP/ED): Performed by: INTERNAL MEDICINE

## 2023-12-12 PROCEDURE — 94640 AIRWAY INHALATION TREATMENT: CPT

## 2023-12-12 PROCEDURE — 2500000001 HC RX 250 WO HCPCS SELF ADMINISTERED DRUGS (ALT 637 FOR MEDICARE OP): Performed by: PHYSICIAN ASSISTANT

## 2023-12-12 PROCEDURE — 2500000002 HC RX 250 W HCPCS SELF ADMINISTERED DRUGS (ALT 637 FOR MEDICARE OP, ALT 636 FOR OP/ED): Performed by: PHYSICIAN ASSISTANT

## 2023-12-12 PROCEDURE — 94667 MNPJ CHEST WALL 1ST: CPT

## 2023-12-12 RX ORDER — PREDNISONE 20 MG/1
40 TABLET ORAL DAILY
Qty: 10 TABLET | Refills: 0 | Status: SHIPPED | OUTPATIENT
Start: 2023-12-12 | End: 2023-12-17

## 2023-12-12 RX ORDER — PREDNISONE 20 MG/1
40 TABLET ORAL DAILY
Status: DISCONTINUED | OUTPATIENT
Start: 2023-12-12 | End: 2023-12-13 | Stop reason: HOSPADM

## 2023-12-12 RX ORDER — IPRATROPIUM BROMIDE AND ALBUTEROL SULFATE 2.5; .5 MG/3ML; MG/3ML
3 SOLUTION RESPIRATORY (INHALATION)
Status: DISCONTINUED | OUTPATIENT
Start: 2023-12-12 | End: 2023-12-13 | Stop reason: HOSPADM

## 2023-12-12 RX ORDER — TALC
3 POWDER (GRAM) TOPICAL ONCE
Status: DISCONTINUED | OUTPATIENT
Start: 2023-12-12 | End: 2023-12-13 | Stop reason: HOSPADM

## 2023-12-12 RX ORDER — AMOXICILLIN AND CLAVULANATE POTASSIUM 875; 125 MG/1; MG/1
1 TABLET, FILM COATED ORAL 2 TIMES DAILY
Qty: 8 TABLET | Refills: 0 | Status: SHIPPED | OUTPATIENT
Start: 2023-12-12 | End: 2023-12-16

## 2023-12-12 RX ADMIN — IPRATROPIUM BROMIDE AND ALBUTEROL SULFATE 3 ML: 2.5; .5 SOLUTION RESPIRATORY (INHALATION) at 01:30

## 2023-12-12 RX ADMIN — APIXABAN 2.5 MG: 2.5 TABLET, FILM COATED ORAL at 09:17

## 2023-12-12 RX ADMIN — LORATADINE 10 MG: 10 TABLET ORAL at 09:17

## 2023-12-12 RX ADMIN — DORZOLAMIDE HYDROCHLORIDE AND TIMOLOL MALEATE 1 DROP: 20; 5 SOLUTION/ DROPS OPHTHALMIC at 09:17

## 2023-12-12 RX ADMIN — IPRATROPIUM BROMIDE AND ALBUTEROL SULFATE 3 ML: 2.5; .5 SOLUTION RESPIRATORY (INHALATION) at 14:29

## 2023-12-12 RX ADMIN — SENNOSIDES 17.2 MG: 8.6 TABLET, FILM COATED ORAL at 09:17

## 2023-12-12 RX ADMIN — PREDNISONE 40 MG: 20 TABLET ORAL at 16:38

## 2023-12-12 RX ADMIN — SERTRALINE HYDROCHLORIDE 100 MG: 50 TABLET ORAL at 09:17

## 2023-12-12 RX ADMIN — IPRATROPIUM BROMIDE AND ALBUTEROL SULFATE 3 ML: 2.5; .5 SOLUTION RESPIRATORY (INHALATION) at 08:05

## 2023-12-12 RX ADMIN — MONTELUKAST 10 MG: 10 TABLET, FILM COATED ORAL at 09:17

## 2023-12-12 RX ADMIN — FUROSEMIDE 20 MG: 20 TABLET ORAL at 09:17

## 2023-12-12 RX ADMIN — PANTOPRAZOLE SODIUM 40 MG: 40 TABLET, DELAYED RELEASE ORAL at 06:47

## 2023-12-12 RX ADMIN — GUAIFENESIN 600 MG: 600 TABLET ORAL at 09:17

## 2023-12-12 RX ADMIN — METOPROLOL SUCCINATE 50 MG: 50 TABLET, EXTENDED RELEASE ORAL at 09:17

## 2023-12-12 RX ADMIN — BUDESONIDE INHALATION 0.5 MG: 0.5 SUSPENSION RESPIRATORY (INHALATION) at 08:04

## 2023-12-12 RX ADMIN — CEFTRIAXONE SODIUM 1 G: 1 INJECTION, SOLUTION INTRAVENOUS at 11:58

## 2023-12-12 ASSESSMENT — COGNITIVE AND FUNCTIONAL STATUS - GENERAL
DRESSING REGULAR LOWER BODY CLOTHING: A LOT
TOILETING: A LOT
EATING MEALS: A LOT
TURNING FROM BACK TO SIDE WHILE IN FLAT BAD: A LITTLE
MOVING FROM LYING ON BACK TO SITTING ON SIDE OF FLAT BED WITH BEDRAILS: A LITTLE
MOBILITY SCORE: 17
DAILY ACTIVITIY SCORE: 12
MOVING TO AND FROM BED TO CHAIR: A LITTLE
DRESSING REGULAR UPPER BODY CLOTHING: A LOT
HELP NEEDED FOR BATHING: A LOT
CLIMB 3 TO 5 STEPS WITH RAILING: A LOT
PERSONAL GROOMING: A LOT
STANDING UP FROM CHAIR USING ARMS: A LITTLE
WALKING IN HOSPITAL ROOM: A LITTLE

## 2023-12-12 NOTE — CARE PLAN
The patient's goals for the shift include      The clinical goals for the shift include will remain comfortable, safe and clinically stable

## 2023-12-12 NOTE — PROGRESS NOTES
12/12/2023 11:39 AM I spoke with daughter Krista and provided SNF list. She requests referrals to [facility] at [place]. Bisi GONZALES

## 2023-12-12 NOTE — CARE PLAN
Spoke to Betty CHRISTIAN at [facility], she states she did not receive PT/OT evals, those were faxed a different way along with this [facility] contact info. Will follow up with her again.     0955- spoke to Dr Robles- states patient will most likely need home o2, Betty made aware via phone call.    0968- ANAHI reviewed pt/ot eval and is recommending SNF prior to return to [place].    1004- detailed VM message left with daughter Rachel regarding ADON rec and pt/ot rec for SNF.    7700- spoke to Rachel who states to also send referral to [facility].

## 2023-12-12 NOTE — PROGRESS NOTES
12/12/2023 2:58 PM I spoke to daughter Rachel. She prefers [facility] but [facility] not [facility]. Daughter agrees to [facility]. Bisi GONZALES

## 2023-12-12 NOTE — PROGRESS NOTES
"Carine Luz is a 89 y.o. female on day 3 of admission presenting with Acute asthma exacerbation.    Subjective   No acute events overnight. Doing well today, still very confused. Discussed with daughter at the bedside. Will work on seeing if she needs O2 going home.        Objective     VITALS  Blood pressure 119/76, pulse 82, temperature 36.8 °C (98.3 °F), resp. rate 18, height 1.651 m (5' 5\"), weight 52.1 kg (114 lb 13.8 oz), SpO2 94 %.  Physical Exam  Vitals and nursing note reviewed.   Constitutional:       Appearance: She is normal weight.   HENT:      Head: Normocephalic and atraumatic.   Eyes:      Comments: Pupil of L eye completely white    Cardiovascular:      Rate and Rhythm: Normal rate and regular rhythm.      Heart sounds: Normal heart sounds.   Pulmonary:      Effort: Pulmonary effort is normal. No respiratory distress.      Breath sounds: Normal breath sounds.   Abdominal:      General: Abdomen is flat. Bowel sounds are normal. There is no distension.      Palpations: Abdomen is soft.   Musculoskeletal:         General: No swelling.   Skin:     Capillary Refill: Capillary refill takes less than 2 seconds.   Neurological:      General: No focal deficit present.      Mental Status: She is alert. Mental status is at baseline. She is disoriented.   Psychiatric:         Mood and Affect: Mood normal.         Behavior: Behavior normal.           Intake/Output last 3 Shifts:  I/O last 3 completed shifts:  In: 360 (6.9 mL/kg) [P.O.:360]  Out: 150 (2.9 mL/kg) [Urine:150 (0.1 mL/kg/hr)]  Weight: 52.1 kg     Relevant Results  Results for orders placed or performed during the hospital encounter of 12/09/23 (from the past 24 hour(s))   CBC   Result Value Ref Range    WBC 8.1 4.4 - 11.3 x10*3/uL    nRBC 0.0 0.0 - 0.0 /100 WBCs    RBC 3.75 (L) 4.00 - 5.20 x10*6/uL    Hemoglobin 10.2 (L) 12.0 - 16.0 g/dL    Hematocrit 32.8 (L) 36.0 - 46.0 %    MCV 88 80 - 100 fL    MCH 27.2 26.0 - 34.0 pg    MCHC 31.1 (L) " 32.0 - 36.0 g/dL    RDW 16.4 (H) 11.5 - 14.5 %    Platelets 203 150 - 450 x10*3/uL   Basic metabolic panel   Result Value Ref Range    Glucose 81 74 - 99 mg/dL    Sodium 131 (L) 136 - 145 mmol/L    Potassium 3.8 3.5 - 5.3 mmol/L    Chloride 101 98 - 107 mmol/L    Bicarbonate 22 21 - 32 mmol/L    Anion Gap 12 10 - 20 mmol/L    Urea Nitrogen 12 6 - 23 mg/dL    Creatinine 0.55 0.50 - 1.05 mg/dL    eGFR 86 >60 mL/min/1.73m*2    Calcium 8.3 (L) 8.6 - 10.3 mg/dL       Imaging Results  Lower extremity venous duplex bilateral   Final Result   No evidence of DVT in the imaged bilateral lower extremities.             Signed by: Stephane Boyle 12/9/2023 4:58 PM   Dictation workstation:   [id number]      XR chest 1 view   Final Result   1. Predominantly right-sided scattered pulmonary opacities, small   right-sided pleural effusion along with cardiomegaly. Finding could   be related to pulmonary edema, however other differential diagnosis   include infectious process or related to chronic lung disease.        Critical Finding:  See findings. Notification was initiated on   12/9/2023 at 10:40 am by  Darío Crowder.  (**-[facility]-**) Instructions:             Signed by: Darío Crowder 12/9/2023 10:41 AM   Dictation workstation:   [id number]          Medications:  apixaban, 2.5 mg, oral, BID  atorvastatin, 80 mg, oral, Nightly  budesonide, 0.5 mg, nebulization, BID  cefTRIAXone, 1 g, intravenous, q24h  dorzolamide-timoloL, 1 drop, Left Eye, BID  furosemide, 20 mg, oral, Daily  guaiFENesin, 600 mg, oral, BID  ipratropium-albuteroL, 3 mL, nebulization, TID  loratadine, 10 mg, oral, Daily  melatonin, 3 mg, oral, Once  metoprolol succinate XL, 50 mg, oral, Daily  montelukast, 10 mg, oral, Daily  pantoprazole, 40 mg, oral, Daily before breakfast  sennosides, 2 tablet, oral, BID  sertraline, 100 mg, oral, Daily       PRN medications: acetaminophen **OR** acetaminophen **OR** acetaminophen, albuterol, albuterol, ondansetron ODT **OR**  ondansetron, oxygen        Assessment/Plan   Principal Problem:    Acute asthma exacerbation  Active Problems:    Asthma exacerbation    Pneumonia    Hypokalemia    Tachypnea    Hypoxemia requiring supplemental oxygen    Weakness generalized    Community Acquired PNA   -Continue with Ceftriaxone    Hypokalemia- Resolved    Hx of Afib   -Metoprolol   -Eliquis     Dementia  -Keep blinds open during the day lights off at night  -Will try to avoid any mind altering medications     Possible UTI  -Continue with Ceftriaxone  -Monitor cx     Detached retina   -Daughter concerned as eye is not completely white  -Not sure there is much to offer at this point     Acute hypoxic respiratory failure   -Wean O2 as tolerated for SpO2 >90  -Evalled by resp therapy will need 2L via nasal canula continuous when she leaves.         DVT Prophylaxis:  Eliquis    Disposition:  [place] not go back to [place] will need SNF placement. [facility]'s working on it.     Sudhir Robles, [facility]

## 2023-12-13 ENCOUNTER — APPOINTMENT (OUTPATIENT)
Dept: PRIMARY CARE | Facility: CLINIC | Age: 88
End: 2023-12-13
Payer: MEDICARE

## 2023-12-15 ENCOUNTER — NURSING HOME VISIT (OUTPATIENT)
Dept: POST ACUTE CARE | Facility: EXTERNAL LOCATION | Age: 88
End: 2023-12-15
Payer: MEDICARE

## 2023-12-15 DIAGNOSIS — D51.0 PERNICIOUS ANEMIA: ICD-10-CM

## 2023-12-15 DIAGNOSIS — R41.89 COGNITIVE IMPAIRMENT: ICD-10-CM

## 2023-12-15 DIAGNOSIS — R26.81 GAIT INSTABILITY: ICD-10-CM

## 2023-12-15 DIAGNOSIS — I48.11 LONGSTANDING PERSISTENT ATRIAL FIBRILLATION (MULTI): ICD-10-CM

## 2023-12-15 DIAGNOSIS — Z99.81 HYPOXEMIA REQUIRING SUPPLEMENTAL OXYGEN: ICD-10-CM

## 2023-12-15 DIAGNOSIS — N18.31 STAGE 3A CHRONIC KIDNEY DISEASE (MULTI): ICD-10-CM

## 2023-12-15 DIAGNOSIS — R53.1 WEAKNESS GENERALIZED: ICD-10-CM

## 2023-12-15 DIAGNOSIS — F41.9 ANXIETY DISORDER, UNSPECIFIED TYPE: ICD-10-CM

## 2023-12-15 DIAGNOSIS — R09.02 HYPOXEMIA REQUIRING SUPPLEMENTAL OXYGEN: ICD-10-CM

## 2023-12-15 DIAGNOSIS — M16.11 ARTHRITIS OF RIGHT HIP: ICD-10-CM

## 2023-12-15 DIAGNOSIS — J18.9 PNEUMONIA OF BOTH LUNGS DUE TO INFECTIOUS ORGANISM, UNSPECIFIED PART OF LUNG: ICD-10-CM

## 2023-12-15 DIAGNOSIS — I50.9 CONGESTIVE HEART FAILURE, UNSPECIFIED HF CHRONICITY, UNSPECIFIED HEART FAILURE TYPE (MULTI): Primary | ICD-10-CM

## 2023-12-15 PROCEDURE — 99306 1ST NF CARE HIGH MDM 50: CPT | Performed by: FAMILY MEDICINE

## 2023-12-15 NOTE — LETTER
Patient: Carine Luz  : 3/14/1930    Encounter Date: 12/15/2023    Chief Complaint/HPI:  Carine Luz is a 89 y.o. female presenting with acute asthma exacerbation, hypoxemia, tachypnea, early pneumonia, hypokalemia, general weakness and fatigue..  Patient has a history of dementia, CHF, asthma, anxiety, A-fib, GERD.  Patient is accompanied by her daughter today who provides most of the history as the patient does have dementia.  Patient lives at an assisted living facility and apparently became short of breath.  According to the notes they gave multiple treatments overnight but the patient's oxygen saturation was in the 70s so the had EMS bring her to the emergency department.  Daughter states that she has noticed her mother has been weak and fatigued about the last 5 days   Treated with ATB   Has been very confused      ROS otherwise negative aside from what was mentioned above in HPI.      Patient Active Problem List   Diagnosis   • Anxiety disorder   • Arthritis of right hip   • Asthma   • Atrial fibrillation (CMS/HCC)   • CKD (chronic kidney disease), stage III (CMS/HCC)   • Chronic urticaria   • Cognitive impairment   • Fatigue   • Gait instability   • Hyperlipidemia   • Moderate persistent extrinsic asthma with exacerbation   • Osteoarthrosis   • Pernicious anemia   • CHF (congestive heart failure) (CMS/HCC)   • Familial hypercholesteremia   • Asthma exacerbation   • Pneumonia   • Hypokalemia   • Acute asthma exacerbation   • Tachypnea   • Hypoxemia requiring supplemental oxygen   • Weakness generalized     Past Medical History:   Diagnosis Date   • Essential (primary) hypertension     Benign essential hypertension   • Noninfective gastroenteritis and colitis, unspecified     Chronic colitis   • Other conditions influencing health status     Bone Density Studies Dual-Energy X-ray Absorptiometry   • Other conditions influencing health status     Substance Use Disorders   • Other conditions  influencing health status     Adenocarcinoma Of The Breast   • Other conditions influencing health status 07/13/2017    History of cough   • Shortness of breath 07/18/2022    Shortness of breath     Past Surgical History:   Procedure Laterality Date   • COLONOSCOPY  04/23/2013    Complete Colonoscopy   • LUMBAR LAMINECTOMY  05/06/2013    Laminectomy Lumbar   • MASTECTOMY  05/06/2013    Breast Surgery Mastectomy   • TOTAL ABDOMINAL HYSTERECTOMY W/ BILATERAL SALPINGOOPHORECTOMY  05/06/2013    Total Abdominal Hysterectomy With Removal Of Both Ovaries   • TOTAL HIP ARTHROPLASTY  05/06/2013    Total Hip Replacement     No family history on file.  Social History     Tobacco Use   • Smoking status: Never   • Smokeless tobacco: Never   Vaping Use   • Vaping Use: Never used   Substance Use Topics   • Alcohol use: Not Currently   • Drug use: Never         ALLERGIES  Allergies   Allergen Reactions   • Codeine Unknown   • Meperidine Dermatitis   • Morphine Hallucinations and Unknown         MEDICATIONS  Current Outpatient Medications   Medication Sig Dispense Refill   • acetaminophen (Tylenol) 325 mg tablet Take 2 tablets (650 mg) by mouth every 6 hours if needed for mild pain (1 - 3).     • albuterol (ProAir RespiClick) 90 mcg/actuation aerosol powdr breath activated inhaler Inhale 2 puffs every 4 hours if needed for shortness of breath.     • albuterol 2.5 mg /3 mL (0.083 %) nebulizer solution Inhale 3 mL 3 times a day as needed for wheezing.     • albuterol 90 mcg/actuation inhaler Inhale 2 puffs every 4 hours if needed for shortness of breath.     • atorvastatin (Lipitor) 80 mg tablet GIVE 1 TABLET BY MOUTH  DAILY AT BEDTIME 90 tablet 10   • budesonide (Pulmicort) 0.5 mg/2 mL nebulizer solution Inhale 2 mL (0.5 mg) every 12 hours.     • cetirizine (ZyrTEC) 10 mg tablet Take 1 tablet (10 mg) by mouth once daily.     • cholecalciferol (Vitamin D-3) 25 MCG (1000 UT) tablet Take 3 tablets (75 mcg) by mouth once daily.     •  cyanocobalamin (Vitamin B-12) 1,000 mcg/mL injection Inject 1 mL (1,000 mcg) into the muscle every 30 (thirty) days.     • dorzolamide-timoloL (Cosopt) 22.3-6.8 mg/mL ophthalmic solution Administer 1 drop into the left eye 2 times a day.     • Eliquis 2.5 mg tablet Take 1 tablet (2.5 mg) by mouth 2 times a day.     • fluticasone propion-salmeteroL (Wixela Inhub) 100-50 mcg/dose diskus inhaler Inhale 1 puff 2 times a day. Rinse mouth with water after use to reduce aftertaste and incidence of candidiasis. Do not swallow.     • furosemide (Lasix) 20 mg tablet GIVE 1 TABLET BY MOUTH  DAILY 30 tablet 10   • guaiFENesin (Mucus Relief ER) 600 mg 12 hr tablet GIVE 1 TABLET BY MOUTH  TWICE DAILY 60 tablet 10   • hydrOXYzine HCL (Atarax) 10 mg tablet Take 1 tablet (10 mg) by mouth once daily at bedtime.     • latanoprost (Xalatan) 0.005 % ophthalmic solution Administer 1 drop into the left eye once daily.     • metoprolol succinate XL (Toprol-XL) 50 mg 24 hr tablet Take 1 tablet (50 mg) by mouth once daily.     • montelukast (Singulair) 10 mg tablet TIT BY MOUTH  DAILY (Patient taking differently: Take 1 tablet (10 mg) by mouth once daily.) 90 tablet 10   • omeprazole (PriLOSEC) 20 mg DR capsule Take 1 capsule (20 mg) by mouth once daily.     • oxygen (O2) gas therapy Inhale 1 each once every 24 hours.     • polyvinyl alcohol (Liquifilm Tears) 1.4 % ophthalmic solution Administer 1 drop into both eyes 3 times a day.     • Restasis 0.05 % ophthalmic emulsion Administer 1 drop into both eyes every 12 hours.     • sertraline (Zoloft) 100 mg tablet Take 1 tablet (100 mg) by mouth once daily.     • triamcinolone (Kenalog) 0.1 % cream 1 Application 3 times a day.     • TURMERIC ORAL Take 1 capsule by mouth once daily.       No current facility-administered medications for this visit.         PHYSICAL EXAM  Physical Exam  Vitals and nursing note reviewed.   Constitutional:       Appearance: She is normal weight.   HENT:      Head:  Normocephalic and atraumatic.   Eyes:      Comments: Pupil of L eye completely white    Cardiovascular:      Rate and Rhythm: Normal rate and regular rhythm.      Heart sounds: Normal heart sounds.   Pulmonary:      Effort: Pulmonary effort is normal. No respiratory distress.      Breath sounds: Normal breath sounds.   Abdominal:      General: Abdomen is flat. Bowel sounds are normal. There is no distension.      Palpations: Abdomen is soft.   Musculoskeletal:         General: No swelling.   Skin:     Capillary Refill: Capillary refill takes less than 2 seconds.   Neurological:      General: No focal deficit present.      Mental Status: She is alert. Mental status is at baseline. She is disoriented.   Psychiatric:         Mood and Affect: Mood normal.         Behavior: Behavior adry  ASSESSMENT/PLAN  Problem List Items Addressed This Visit       Anxiety disorder    Arthritis of right hip    Atrial fibrillation (CMS/HCC)    CKD (chronic kidney disease), stage III (CMS/[facility])    Cognitive impairment    Gait instability    Pernicious anemia    CHF (congestive heart failure) (CMS/[facility]) - Primary    Pneumonia    Hypoxemia requiring supplemental oxygen    Weakness generalized     Community Acquired PNA   -Finished ATB     Hypokalemia- Resolved     Hx of Afib   -Metoprolol   -Eliquis      Dementia  -Keep blinds open during the day lights off at night  -Will try to avoid any mind altering medications   Supportuive care      Acute hypoxic respiratory failure   -Wean O2 as tolerated for SpO2 >90  -Evalled by resp therapy will need 2L via nasal canula continuous when she leaves.      Deconditioning OT?PT    PLAN:Reviewed orders, medications, records, and pertinent labs/x-rays from   hospital. Monitor VS, BS, O2, etc as per protocol. See written orders. PT/OT   will evaluate and start appropriate rehabilitation program. Reviewed and signed   off orders, medications,Labs, x-rays, and current diagnoses. Reviewed and   updated CPR  status and any changes in Advanced directives. Continue Rehab Will   see 1-2 times weekly for next 30 days then reassess. Will always see at   resident, family , or nursing request. Discharge Planning   Time   Time Spent With Patient: 45 minutes of which greater than 50 percent was spent   counseling and or coordinating care.      Gianfranco Cheng MD      Electronically Signed By: Gianfranco Cheng MD   12/18/23  1:34 AM

## 2023-12-18 NOTE — PROGRESS NOTES
Chief Complaint/HPI:  Carine Luz is a 89 y.o. female presenting with acute asthma exacerbation, hypoxemia, tachypnea, early pneumonia, hypokalemia, general weakness and fatigue..  Patient has a history of dementia, CHF, asthma, anxiety, A-fib, GERD.  Patient is accompanied by her daughter today who provides most of the history as the patient does have dementia.  Patient lives at an assisted living facility and apparently became short of breath.  According to the notes they gave multiple treatments overnight but the patient's oxygen saturation was in the 70s so the had EMS bring her to the emergency department.  Daughter states that she has noticed her mother has been weak and fatigued about the last 5 days   Treated with ATB   Has been very confused      ROS otherwise negative aside from what was mentioned above in HPI.      Patient Active Problem List   Diagnosis    Anxiety disorder    Arthritis of right hip    Asthma    Atrial fibrillation (CMS/HCC)    CKD (chronic kidney disease), stage III (CMS/HCC)    Chronic urticaria    Cognitive impairment    Fatigue    Gait instability    Hyperlipidemia    Moderate persistent extrinsic asthma with exacerbation    Osteoarthrosis    Pernicious anemia    CHF (congestive heart failure) (CMS/HCC)    Familial hypercholesteremia    Asthma exacerbation    Pneumonia    Hypokalemia    Acute asthma exacerbation    Tachypnea    Hypoxemia requiring supplemental oxygen    Weakness generalized     Past Medical History:   Diagnosis Date    Essential (primary) hypertension     Benign essential hypertension    Noninfective gastroenteritis and colitis, unspecified     Chronic colitis    Other conditions influencing health status     Bone Density Studies Dual-Energy X-ray Absorptiometry    Other conditions influencing health status     Substance Use Disorders    Other conditions influencing health status     Adenocarcinoma Of The Breast    Other conditions influencing health status  07/13/2017    History of cough    Shortness of breath 07/18/2022    Shortness of breath     Past Surgical History:   Procedure Laterality Date    COLONOSCOPY  04/23/2013    Complete Colonoscopy    LUMBAR LAMINECTOMY  05/06/2013    Laminectomy Lumbar    MASTECTOMY  05/06/2013    Breast Surgery Mastectomy    TOTAL ABDOMINAL HYSTERECTOMY W/ BILATERAL SALPINGOOPHORECTOMY  05/06/2013    Total Abdominal Hysterectomy With Removal Of Both Ovaries    TOTAL HIP ARTHROPLASTY  05/06/2013    Total Hip Replacement     No family history on file.  Social History     Tobacco Use    Smoking status: Never    Smokeless tobacco: Never   Vaping Use    Vaping Use: Never used   Substance Use Topics    Alcohol use: Not Currently    Drug use: Never         ALLERGIES  Allergies   Allergen Reactions    Codeine Unknown    Meperidine Dermatitis    Morphine Hallucinations and Unknown         MEDICATIONS  Current Outpatient Medications   Medication Sig Dispense Refill    acetaminophen (Tylenol) 325 mg tablet Take 2 tablets (650 mg) by mouth every 6 hours if needed for mild pain (1 - 3).      albuterol (ProAir RespiClick) 90 mcg/actuation aerosol powdr breath activated inhaler Inhale 2 puffs every 4 hours if needed for shortness of breath.      albuterol 2.5 mg /3 mL (0.083 %) nebulizer solution Inhale 3 mL 3 times a day as needed for wheezing.      albuterol 90 mcg/actuation inhaler Inhale 2 puffs every 4 hours if needed for shortness of breath.      atorvastatin (Lipitor) 80 mg tablet GIVE 1 TABLET BY MOUTH  DAILY AT BEDTIME 90 tablet 10    budesonide (Pulmicort) 0.5 mg/2 mL nebulizer solution Inhale 2 mL (0.5 mg) every 12 hours.      cetirizine (ZyrTEC) 10 mg tablet Take 1 tablet (10 mg) by mouth once daily.      cholecalciferol (Vitamin D-3) 25 MCG (1000 UT) tablet Take 3 tablets (75 mcg) by mouth once daily.      cyanocobalamin (Vitamin B-12) 1,000 mcg/mL injection Inject 1 mL (1,000 mcg) into the muscle every 30 (thirty) days.       dorzolamide-timoloL (Cosopt) 22.3-6.8 mg/mL ophthalmic solution Administer 1 drop into the left eye 2 times a day.      Eliquis 2.5 mg tablet Take 1 tablet (2.5 mg) by mouth 2 times a day.      fluticasone propion-salmeteroL (Wixela Inhub) 100-50 mcg/dose diskus inhaler Inhale 1 puff 2 times a day. Rinse mouth with water after use to reduce aftertaste and incidence of candidiasis. Do not swallow.      furosemide (Lasix) 20 mg tablet GIVE 1 TABLET BY MOUTH  DAILY 30 tablet 10    guaiFENesin (Mucus Relief ER) 600 mg 12 hr tablet GIVE 1 TABLET BY MOUTH  TWICE DAILY 60 tablet 10    hydrOXYzine HCL (Atarax) 10 mg tablet Take 1 tablet (10 mg) by mouth once daily at bedtime.      latanoprost (Xalatan) 0.005 % ophthalmic solution Administer 1 drop into the left eye once daily.      metoprolol succinate XL (Toprol-XL) 50 mg 24 hr tablet Take 1 tablet (50 mg) by mouth once daily.      montelukast (Singulair) 10 mg tablet TIT BY MOUTH  DAILY (Patient taking differently: Take 1 tablet (10 mg) by mouth once daily.) 90 tablet 10    omeprazole (PriLOSEC) 20 mg DR capsule Take 1 capsule (20 mg) by mouth once daily.      oxygen (O2) gas therapy Inhale 1 each once every 24 hours.      polyvinyl alcohol (Liquifilm Tears) 1.4 % ophthalmic solution Administer 1 drop into both eyes 3 times a day.      Restasis 0.05 % ophthalmic emulsion Administer 1 drop into both eyes every 12 hours.      sertraline (Zoloft) 100 mg tablet Take 1 tablet (100 mg) by mouth once daily.      triamcinolone (Kenalog) 0.1 % cream 1 Application 3 times a day.      TURMERIC ORAL Take 1 capsule by mouth once daily.       No current facility-administered medications for this visit.         PHYSICAL EXAM  Physical Exam  Vitals and nursing note reviewed.   Constitutional:       Appearance: She is normal weight.   HENT:      Head: Normocephalic and atraumatic.   Eyes:      Comments: Pupil of L eye completely white    Cardiovascular:      Rate and Rhythm: Normal rate  and regular rhythm.      Heart sounds: Normal heart sounds.   Pulmonary:      Effort: Pulmonary effort is normal. No respiratory distress.      Breath sounds: Normal breath sounds.   Abdominal:      General: Abdomen is flat. Bowel sounds are normal. There is no distension.      Palpations: Abdomen is soft.   Musculoskeletal:         General: No swelling.   Skin:     Capillary Refill: Capillary refill takes less than 2 seconds.   Neurological:      General: No focal deficit present.      Mental Status: She is alert. Mental status is at baseline. She is disoriented.   Psychiatric:         Mood and Affect: Mood normal.         Behavior: Behavior adry  ASSESSMENT/PLAN  Problem List Items Addressed This Visit       Anxiety disorder    Arthritis of right hip    Atrial fibrillation (CMS/HCC)    CKD (chronic kidney disease), stage III (CMS/HCC)    Cognitive impairment    Gait instability    Pernicious anemia    CHF (congestive heart failure) (CMS/[facility]) - Primary    Pneumonia    Hypoxemia requiring supplemental oxygen    Weakness generalized     Community Acquired PNA   -Finished ATB     Hypokalemia- Resolved     Hx of Afib   -Metoprolol   -Eliquis      Dementia  -Keep blinds open during the day lights off at night  -Will try to avoid any mind altering medications   Supportuive care      Acute hypoxic respiratory failure   -Wean O2 as tolerated for SpO2 >90  -Evalled by resp therapy will need 2L via nasal canula continuous when she leaves.      Deconditioning OT?PT    PLAN:Reviewed orders, medications, records, and pertinent labs/x-rays from   hospital. Monitor VS, BS, O2, etc as per protocol. See written orders. PT/OT   will evaluate and start appropriate rehabilitation program. Reviewed and signed   off orders, medications,Labs, x-rays, and current diagnoses. Reviewed and   updated CPR status and any changes in Advanced directives. Continue Rehab Will   see 1-2 times weekly for next 30 days then reassess. Will always see  at   resident, family , or nursing request. Discharge Planning   Time   Time Spent With Patient: 45 minutes of which greater than 50 percent was spent   counseling and or coordinating care.      Gianfranco Cheng MD

## 2023-12-29 NOTE — DISCHARGE SUMMARY
Discharge Diagnosis  Acute asthma exacerbation    Issues Requiring Follow-Up  Continued outpatient follow-up with primary care physician    Discharge Meds     Your medication list        START taking these medications        Instructions Last Dose Given Next Dose Due   amoxicillin-pot clavulanate 875-125 mg tablet  Commonly known as: Augmentin      Take 1 tablet (875 mg) by mouth 2 times a day for 4 days.       oxygen gas therapy  Commonly known as: O2      Inhale 1 each once every 24 hours.       predniSONE 20 mg tablet  Commonly known as: Deltasone      Take 2 tablets (40 mg) by mouth once daily for 5 doses.              CHANGE how you take these medications        Instructions Last Dose Given Next Dose Due   montelukast 10 mg tablet  Commonly known as: Singulair  What changed: See the new instructions.      TIT BY MOUTH  DAILY              CONTINUE taking these medications        Instructions Last Dose Given Next Dose Due   acetaminophen 325 mg tablet  Commonly known as: Tylenol           albuterol 90 mcg/actuation inhaler           albuterol 2.5 mg /3 mL (0.083 %) nebulizer solution           ProAir RespiClick 90 mcg/actuation aerosol powdr breath activated inhaler  Generic drug: albuterol           atorvastatin 80 mg tablet  Commonly known as: Lipitor      GIVE 1 TABLET BY MOUTH  DAILY AT BEDTIME       budesonide 0.5 mg/2 mL nebulizer solution  Commonly known as: Pulmicort           cetirizine 10 mg tablet  Commonly known as: ZyrTEC           cholecalciferol 25 MCG (1000 UT) tablet  Commonly known as: Vitamin D-3           cyanocobalamin 1,000 mcg/mL injection  Commonly known as: Vitamin B-12           dorzolamide-timoloL 22.3-6.8 mg/mL ophthalmic solution  Commonly known as: Cosopt           Eliquis 2.5 mg tablet  Generic drug: apixaban           Wixela Inhub 100-50 mcg/dose diskus inhaler  Generic drug: fluticasone propion-salmeteroL           furosemide 20 mg tablet  Commonly known as: Lasix      GIVE 1  TABLET BY MOUTH  DAILY       hydrOXYzine HCL 10 mg tablet  Commonly known as: Atarax           latanoprost 0.005 % ophthalmic solution  Commonly known as: Xalatan           metoprolol succinate XL 50 mg 24 hr tablet  Commonly known as: Toprol-XL           Mucus Relief  mg 12 hr tablet  Generic drug: guaiFENesin      GIVE 1 TABLET BY MOUTH  TWICE DAILY       nitrofurantoin (macrocrystal-monohydrate) 100 mg capsule  Commonly known as: Macrobid      Take 1 capsule (100 mg) by mouth 2 times a day for 5 days.       omeprazole 20 mg DR capsule  Commonly known as: PriLOSEC           polyvinyl alcohol 1.4 % ophthalmic solution  Commonly known as: Liquifilm Tears           Restasis 0.05 % ophthalmic emulsion  Generic drug: cycloSPORINE           sertraline 100 mg tablet  Commonly known as: Zoloft           triamcinolone 0.1 % cream  Commonly known as: Kenalog           TURMERIC ORAL                  STOP taking these medications      peg 400-propylene glycol 0.4-0.3 % drops ophthalmic drops  Commonly known as: SYSTANE                  Where to Get Your Medications        These medications were sent to [place], [place] - [address] [facility]  [address] [address]      Phone: [phone]   amoxicillin-pot clavulanate 875-125 mg tablet  predniSONE 20 mg tablet       Information about where to get these medications is not yet available    Ask your nurse or doctor about these medications  oxygen gas therapy         Test Results Pending At Discharge  Pending Labs       No current pending labs.            Procedures       Hospital Course   I was admitted for acute asthma exacerbation.  She was also treated for a community-acquired pneumonia.  She was kept on steroids and antibiotics and her symptoms did improve fairly dramatically.  She was evaluated by PT and OT will be going to a skilled nursing facility for some rehab before returning to her assisted living facility.  This plan was discussed  "with her as well as her daughter and they are agreement.  All questions were answered.    Blood pressure 119/76, pulse 82, temperature 36.8 °C (98.3 °F), resp. rate 18, height 1.651 m (5' 5\"), weight 52.1 kg (114 lb 13.8 oz), SpO2 91 %.  Pertinent Physical Exam At Time of Discharge  Physical Exam  Please see my progress note from this date  Outpatient Follow-Up  No future appointments.      Sudhir Robles, DO          "

## 2024-02-22 ENCOUNTER — TELEPHONE (OUTPATIENT)
Dept: PRIMARY CARE | Facility: CLINIC | Age: 89
End: 2024-02-22
Payer: MEDICARE

## 2024-02-22 NOTE — TELEPHONE ENCOUNTER
She would like to speak with you about her mom. Her dementia is so bad and she is anemic at 10.3 and had blood in her stool and they want her to see GI and she is just torn on what to do? You can call tomorrow if you have to leave today.